# Patient Record
Sex: FEMALE | Race: WHITE | NOT HISPANIC OR LATINO | Employment: FULL TIME | URBAN - METROPOLITAN AREA
[De-identification: names, ages, dates, MRNs, and addresses within clinical notes are randomized per-mention and may not be internally consistent; named-entity substitution may affect disease eponyms.]

---

## 2023-09-15 LAB
ALANINE AMINOTRANSFERASE (SGPT) (U/L) IN SER/PLAS: 10 U/L (ref 7–45)
ASPARTATE AMINOTRANSFERASE (SGOT) (U/L) IN SER/PLAS: 11 U/L (ref 9–39)
CREATININE (MG/DL) IN SER/PLAS: 0.61 MG/DL (ref 0.5–1.05)
CREATININE (MG/DL) IN URINE: 82 MG/DL (ref 20–320)
ERYTHROCYTE DISTRIBUTION WIDTH (RATIO) BY AUTOMATED COUNT: 12 % (ref 11.5–14.5)
ERYTHROCYTE MEAN CORPUSCULAR HEMOGLOBIN CONCENTRATION (G/DL) BY AUTOMATED: 33.3 G/DL (ref 32–36)
ERYTHROCYTE MEAN CORPUSCULAR VOLUME (FL) BY AUTOMATED COUNT: 93 FL (ref 80–100)
ERYTHROCYTES (10*6/UL) IN BLOOD BY AUTOMATED COUNT: 4.97 X10E12/L (ref 4–5.2)
GFR FEMALE: >90 ML/MIN/1.73M2
HEMATOCRIT (%) IN BLOOD BY AUTOMATED COUNT: 46.3 % (ref 36–46)
HEMOGLOBIN (G/DL) IN BLOOD: 15.4 G/DL (ref 12–16)
LACTATE DEHYDROGENASE (U/L) IN SER/PLAS BY LAC->PYR RXN: 138 U/L (ref 84–246)
LEUKOCYTES (10*3/UL) IN BLOOD BY AUTOMATED COUNT: 9.8 X10E9/L (ref 4.4–11.3)
PLATELETS (10*3/UL) IN BLOOD AUTOMATED COUNT: 225 X10E9/L (ref 150–450)
PROTEIN (MG/DL) IN URINE: 7 MG/DL (ref 5–24)
PROTEIN/CREATININE (MG/MG) IN URINE: 0.09 MG/MG CREAT (ref 0–0.17)
URATE (MG/DL) IN SER/PLAS: 3.6 MG/DL (ref 2.3–6.7)

## 2023-09-16 LAB
ABO GROUP (TYPE) IN BLOOD: NORMAL
ANTIBODY SCREEN: NORMAL
HEPATITIS B VIRUS SURFACE AG PRESENCE IN SERUM: NONREACTIVE
HEPATITIS C VIRUS AB PRESENCE IN SERUM: NONREACTIVE
HIV 1/ 2 AG/AB SCREEN: NONREACTIVE
RH FACTOR: NORMAL
RUBELLA VIRUS IGG AB: POSITIVE
SYPHILIS TOTAL AB: NONREACTIVE

## 2023-10-15 PROBLEM — F33.1 MODERATE RECURRENT MAJOR DEPRESSION (MULTI): Status: ACTIVE | Noted: 2023-10-15

## 2023-10-15 PROBLEM — F12.90 MARIJUANA USER: Status: ACTIVE | Noted: 2023-10-15

## 2023-10-15 PROBLEM — E55.9 VITAMIN D DEFICIENCY: Status: ACTIVE | Noted: 2023-10-15

## 2023-10-15 PROBLEM — O35.9XX0 SUSPECTED FETAL ANOMALY, ANTEPARTUM (HHS-HCC): Status: ACTIVE | Noted: 2023-10-15

## 2023-10-15 PROBLEM — Z87.59 HISTORY OF PREGNANCY INDUCED HYPERTENSION: Status: ACTIVE | Noted: 2023-10-15

## 2023-10-15 PROBLEM — O99.210 OBESITY IN PREGNANCY (HHS-HCC): Status: ACTIVE | Noted: 2023-10-15

## 2023-10-15 PROBLEM — F41.1 GAD (GENERALIZED ANXIETY DISORDER): Status: ACTIVE | Noted: 2023-10-15

## 2023-10-15 PROBLEM — K21.00 REFLUX ESOPHAGITIS: Status: ACTIVE | Noted: 2023-10-15

## 2023-10-15 PROBLEM — Z87.59 HISTORY OF GESTATIONAL HYPERTENSION: Status: ACTIVE | Noted: 2023-10-15

## 2023-10-15 RX ORDER — SYRINGE WITH NEEDLE, INSULIN
1 SYRINGE, EMPTY DISPOSABLE MISCELLANEOUS DAILY
COMMUNITY

## 2023-10-15 RX ORDER — ASPIRIN 81 MG/1
162 TABLET ORAL
COMMUNITY
End: 2024-05-05 | Stop reason: HOSPADM

## 2023-10-15 RX ORDER — ACETAMINOPHEN 500 MG
500 TABLET ORAL EVERY 6 HOURS PRN
COMMUNITY
Start: 2021-12-20 | End: 2023-10-17 | Stop reason: WASHOUT

## 2023-10-15 RX ORDER — CITALOPRAM 20 MG/1
20 TABLET, FILM COATED ORAL DAILY
COMMUNITY
Start: 2019-12-11 | End: 2024-04-01 | Stop reason: SDUPTHER

## 2023-10-15 RX ORDER — TRIAMCINOLONE ACETONIDE 1 MG/G
CREAM TOPICAL
COMMUNITY
Start: 2016-08-19 | End: 2023-10-17 | Stop reason: WASHOUT

## 2023-10-15 RX ORDER — DOCUSATE SODIUM 100 MG/1
100 CAPSULE, LIQUID FILLED ORAL 2 TIMES DAILY PRN
COMMUNITY
Start: 2021-12-20 | End: 2023-10-17 | Stop reason: WASHOUT

## 2023-10-15 RX ORDER — IBUPROFEN 600 MG/1
600 TABLET ORAL EVERY 6 HOURS PRN
COMMUNITY
Start: 2021-12-20 | End: 2023-10-17 | Stop reason: WASHOUT

## 2023-10-15 RX ORDER — ERGOCALCIFEROL (VITAMIN D2) 10 MCG
TABLET ORAL
COMMUNITY
Start: 2019-06-13 | End: 2023-10-17 | Stop reason: WASHOUT

## 2023-10-15 RX ORDER — KETOCONAZOLE 20 MG/G
CREAM TOPICAL
COMMUNITY
Start: 2016-08-19 | End: 2023-10-17 | Stop reason: WASHOUT

## 2023-10-17 ENCOUNTER — INITIAL PRENATAL (OUTPATIENT)
Dept: OBSTETRICS AND GYNECOLOGY | Facility: CLINIC | Age: 28
End: 2023-10-17
Payer: COMMERCIAL

## 2023-10-17 VITALS — WEIGHT: 188 LBS | SYSTOLIC BLOOD PRESSURE: 108 MMHG | BODY MASS INDEX: 27.76 KG/M2 | DIASTOLIC BLOOD PRESSURE: 70 MMHG

## 2023-10-17 DIAGNOSIS — F41.9 ANXIETY: ICD-10-CM

## 2023-10-17 DIAGNOSIS — Z34.81 NORMAL PREGNANCY IN MULTIGRAVIDA IN FIRST TRIMESTER (HHS-HCC): Primary | ICD-10-CM

## 2023-10-17 DIAGNOSIS — Z87.59 HISTORY OF GESTATIONAL HYPERTENSION: ICD-10-CM

## 2023-10-17 DIAGNOSIS — Z12.4 CERVICAL CANCER SCREENING: ICD-10-CM

## 2023-10-17 LAB
POC APPEARANCE, URINE: CLEAR
POC BILIRUBIN, URINE: NEGATIVE
POC BLOOD, URINE: NEGATIVE
POC COLOR, URINE: YELLOW
POC GLUCOSE, URINE: NEGATIVE MG/DL
POC KETONES, URINE: NEGATIVE MG/DL
POC LEUKOCYTES, URINE: NEGATIVE
POC NITRITE,URINE: NEGATIVE
POC PH, URINE: 6 PH
POC PROTEIN, URINE: NEGATIVE MG/DL
POC SPECIFIC GRAVITY, URINE: 1.02
POC UROBILINOGEN, URINE: 0.2 EU/DL

## 2023-10-17 PROCEDURE — 0500F INITIAL PRENATAL CARE VISIT: CPT | Performed by: OBSTETRICS & GYNECOLOGY

## 2023-10-17 PROCEDURE — 87800 DETECT AGNT MULT DNA DIREC: CPT

## 2023-10-17 PROCEDURE — 87086 URINE CULTURE/COLONY COUNT: CPT

## 2023-10-17 PROCEDURE — 88142 CYTOPATH C/V THIN LAYER: CPT

## 2023-10-17 PROCEDURE — 81003 URINALYSIS AUTO W/O SCOPE: CPT | Performed by: OBSTETRICS & GYNECOLOGY

## 2023-10-17 ASSESSMENT — SOCIAL DETERMINANTS OF HEALTH (SDOH)
WITHIN THE LAST YEAR, HAVE TO BEEN RAPED OR FORCED TO HAVE ANY KIND OF SEXUAL ACTIVITY BY YOUR PARTNER OR EX-PARTNER?: NO
WITHIN THE LAST YEAR, HAVE YOU BEEN KICKED, HIT, SLAPPED, OR OTHERWISE PHYSICALLY HURT BY YOUR PARTNER OR EX-PARTNER?: NO
WITHIN THE LAST YEAR, HAVE YOU BEEN AFRAID OF YOUR PARTNER OR EX-PARTNER?: NO
WITHIN THE LAST YEAR, HAVE YOU BEEN HUMILIATED OR EMOTIONALLY ABUSED IN OTHER WAYS BY YOUR PARTNER OR EX-PARTNER?: NO

## 2023-10-17 NOTE — PATIENT INSTRUCTIONS
Welcome to prenatal care with [facility]!  You were seen in the office today for your initiation to prenatal care and had normal findings on exam  Continue routine OB precautions at home  Your labs were reviewed today and were normal. Routine pelvic cultures, urine culture, and pap smear (if you were due) were done today and you will be notified of the results  If you have had genetic screening labs done, we usually receive the results from [facility] within 7-9 business days and we will call you with the results.   Continue taking prenatal vitamins   Avoid sick contacts and consider getting your Flu (available in office during flu season) and COVID vaccines to protect against infection in pregnancy    What to expect:  -    You will see each of our physicians at least once during your prenatal care. There are 5 physicians (Placido Herrera, Israel, Acosta, Davis, and Marlon) and our NP Maribel Winters. We rotate OB call to be able to provide the best care to our patients during this important time, and we want to make sure you have had a chance to meet each physician prior to coming in for labor.   -    We will see you in the office every 4 weeks in the first two trimesters, every 2 weeks between 30 and 36 weeks gestation, and weekly following 36 weeks. Anatomy ultrasounds are done at [facility] around 20 weeks, gestational diabetes and anemia screening is done through outpatient labs between 25 and 28 weeks gestation, and labor and delivery preparations (ultrasound to confirm presentation, consent forms, etc) are done at 36 weeks in the office.   -    Visits can seem quick, but provide us with quite a bit of important information. So it is important to try not to miss any visits if possible and make up any cancelled appointments as soon as possible. Make sure you come to each visit with a full bladder because urine testing for bacteria, glucose, and protein are done at each visit. And although the OB visits may seem quick,  we are happy to take the time to answer any questions or discuss any concerns you may have  -    We deliver at [facility]: [address] [place], [address]  -    Birth, lactation, and parenting classes, as well as scheduling for hospital tours can be done through [email or web address].       Make an appointment for routine care in the office in the next 4 weeks  If you are having any bleeding, pain, severe nausea and/or vomiting, or any other concerns prior to your next visit, please call the office to speak to the physician on call. This includes after hours, weekends, and holidays, when the answering service will be able to connect you with the physician on call. [phone] ([place] [facility]) or [phone] ([place] [facility]).    Congratulations, we are excited to partner with you in the care of your pregnancy!

## 2023-10-17 NOTE — PROGRESS NOTES
Subjective   Patient ID [id number]   Geeta Reynaga is a 28 y.o.  at 12w3d with a working estimated date of delivery of 2024, by Ultrasound who presents for an initial prenatal visit. This pregnancy is unplanned.    Her pregnancy is complicated by:  Anxiety  Hx of GHTN    OB History    Para Term  AB Living   3 2 2     2   SAB IAB Ectopic Multiple Live Births           2      # Outcome Date GA Lbr Shin/2nd Weight Sex Delivery Anes PTL Lv   3 Current            2 Term 21 39w0d   F Vag-Spont EPI N OLGA   1 Term 20 40w0d  3260 g M Vag-Spont EPI N OLGA          Objective   Physical Exam  Weight: 85.3 kg (188 lb)  Expected Total Weight Gain: 7 kg (15 lb)-11.5 kg (25 lb)   Pregravid BMI: 26.57  BP: 108/70    Fetal Heart Rate: +     Physical Exam  Constitutional:       Appearance: Normal appearance.   Genitourinary:      Genitourinary Comments: Vulva normal in appearance without discoloration, rashes, lesions. Vagina is negative for blood, discharge, lesions. Uterus is small, mobile, non tender. There is no cervical motion tenderness, adnexal masses/tenderness.    Cardiovascular:      Rate and Rhythm: Normal rate and regular rhythm.   Pulmonary:      Effort: Pulmonary effort is normal.      Breath sounds: Normal breath sounds.   Abdominal:      General: Abdomen is flat.      Palpations: Abdomen is soft.      Tenderness: There is no abdominal tenderness.   Musculoskeletal:         General: Normal range of motion.      Cervical back: Normal range of motion.   Neurological:      General: No focal deficit present.   Skin:     General: Skin is warm and dry.   Psychiatric:         Mood and Affect: Mood normal.   Vitals and nursing note reviewed.         Prenatal Labs  wnl    Assessment/Plan   Problem List Items Addressed This Visit             ICD-10-CM    History of gestational hypertension Z87.59     Other Visit Diagnoses         Codes    Normal pregnancy in multigravida in first trimester     -  Primary Z34.81    PNL reviewed/wnl. Advised PNV  Declines genetic screening  RTO 4 weeks     Anxiety     F41.9    Continue Citalopram as prescribed             Immunizations: Advised Tdap, Flu, RSV, COVID   Prenatal Labs wnl  Daily prenatal vitamins prescribed  First trimester screening and second trimester screening discussed. Patient decided to decline  Follow up in 4 weeks for return OB visit.  Nu Lozano, DO

## 2023-10-19 LAB
BACTERIA UR CULT: NORMAL
C TRACH RRNA SPEC QL NAA+PROBE: NEGATIVE
N GONORRHOEA DNA SPEC QL PROBE+SIG AMP: NEGATIVE

## 2023-10-30 LAB
CYTOLOGY CMNT CVX/VAG CYTO-IMP: NORMAL
LAB AP HPV GENOTYPE QUESTION: YES
LAB AP HPV HR: NORMAL
LAB AP PAP ADDITIONAL TESTS: NORMAL
LABORATORY COMMENT REPORT: NORMAL
LABORATORY COMMENT REPORT: NORMAL
LMP START DATE: NORMAL
MENSTRUAL HX REPORTED: NORMAL
PATH REPORT.TOTAL CANCER: NORMAL

## 2023-11-14 ENCOUNTER — ROUTINE PRENATAL (OUTPATIENT)
Dept: OBSTETRICS AND GYNECOLOGY | Facility: CLINIC | Age: 28
End: 2023-11-14
Payer: COMMERCIAL

## 2023-11-14 VITALS — WEIGHT: 194 LBS | DIASTOLIC BLOOD PRESSURE: 72 MMHG | SYSTOLIC BLOOD PRESSURE: 102 MMHG | BODY MASS INDEX: 28.65 KG/M2

## 2023-11-14 DIAGNOSIS — Z3A.16 16 WEEKS GESTATION OF PREGNANCY (HHS-HCC): ICD-10-CM

## 2023-11-14 LAB
POC APPEARANCE, URINE: CLEAR
POC BILIRUBIN, URINE: NEGATIVE
POC BLOOD, URINE: NEGATIVE
POC COLOR, URINE: YELLOW
POC GLUCOSE, URINE: NEGATIVE MG/DL
POC KETONES, URINE: NEGATIVE MG/DL
POC LEUKOCYTES, URINE: NEGATIVE
POC NITRITE,URINE: NEGATIVE
POC PH, URINE: 8 PH
POC PROTEIN, URINE: NEGATIVE MG/DL
POC SPECIFIC GRAVITY, URINE: 1.01
POC UROBILINOGEN, URINE: 0.2 EU/DL

## 2023-11-14 PROCEDURE — 90686 IIV4 VACC NO PRSV 0.5 ML IM: CPT | Performed by: OBSTETRICS & GYNECOLOGY

## 2023-11-14 PROCEDURE — 0501F PRENATAL FLOW SHEET: CPT | Performed by: OBSTETRICS & GYNECOLOGY

## 2023-11-14 PROCEDURE — 81003 URINALYSIS AUTO W/O SCOPE: CPT | Performed by: OBSTETRICS & GYNECOLOGY

## 2023-11-14 PROCEDURE — 90471 IMMUNIZATION ADMIN: CPT | Performed by: OBSTETRICS & GYNECOLOGY

## 2023-11-14 NOTE — PROGRESS NOTES
Subjective   Patient ID [id number]   Geeta Reynaga is a 28 y.o.  at 16w3d no FM yet.    Objective   Physical Exam  Weight: 88 kg (194 lb)  BP: 102/72  Fetal Heart Rate: 140 Fundal Height (cm): 16 cm    Lab Results   Component Value Date    HGB 15.4 09/15/2023    HCT 46.3 (H) 09/15/2023       Assessment/Plan   Declines genetics.  Flu vaccine today.  Anatomy ultrasound.  Follow up in 4 weeks for a routine prenatal visit.

## 2023-12-12 ENCOUNTER — ROUTINE PRENATAL (OUTPATIENT)
Dept: OBSTETRICS AND GYNECOLOGY | Facility: CLINIC | Age: 28
End: 2023-12-12
Payer: COMMERCIAL

## 2023-12-12 VITALS — WEIGHT: 197 LBS | BODY MASS INDEX: 29.09 KG/M2 | SYSTOLIC BLOOD PRESSURE: 110 MMHG | DIASTOLIC BLOOD PRESSURE: 60 MMHG

## 2023-12-12 DIAGNOSIS — Z34.82 MULTIGRAVIDA IN SECOND TRIMESTER (HHS-HCC): Primary | ICD-10-CM

## 2023-12-12 DIAGNOSIS — Z3A.20 20 WEEKS GESTATION OF PREGNANCY (HHS-HCC): ICD-10-CM

## 2023-12-12 LAB
POC GLUCOSE, URINE: NEGATIVE MG/DL
POC PROTEIN, URINE: ABNORMAL MG/DL

## 2023-12-12 PROCEDURE — 81003 URINALYSIS AUTO W/O SCOPE: CPT | Performed by: OBSTETRICS & GYNECOLOGY

## 2023-12-12 PROCEDURE — 0501F PRENATAL FLOW SHEET: CPT | Performed by: OBSTETRICS & GYNECOLOGY

## 2023-12-12 NOTE — PATIENT INSTRUCTIONS
You were seen in the office today for routine OB care and had normal findings on exam  Continue routine OB precautions at home  Continue taking prenatal vitamins   Avoid sick contacts and consider getting your Flu (available in office during flu season) and COVID vaccines to protect against infection in pregnancy  You will be given an order for your anatomy ultrasound. Please schedule at [facility] between 19 and 22 weeks gestation [phone]  You will be given a bottle of Glucola and orders for your second trimester labs. Please complete these between 25 and 28 weeks gestation. You may complete these at any  outpatient lab, and do not need an appointment  Make an appointment for routine care in the office in the next 4 weeks  If you are having any concerns prior to your next visit please call the office to speak to the physician on call. This includes after hours, weekends, and holidays, when the answering service will be able to connect you with the physician on call. [phone] ([place] [facility]) or [phone] [facility].

## 2023-12-12 NOTE — PROGRESS NOTES
Subjective   Patient ID [id number]   Geeta Reynaga is a 28 y.o.  at 20w3d with a working estimated date of delivery of 2024, by Ultrasound who presents for a routine prenatal visit. She denies vaginal bleeding, leakage of fluid, decreased fetal movements, or contractions.    Her pregnancy is complicated by:  Hx GHTN  Anxiety    Objective   Physical Exam  Weight: 89.4 kg (197 lb)  Expected Total Weight Gain: 7 kg (15 lb)-11.5 kg (25 lb)   Pregravid BMI: 26.57  BP: 110/60         Prenatal Labs  Urine dip:  Lab Results   Component Value Date    KETONESU NEGATIVE 2023       Lab Results   Component Value Date    HGB 15.4 09/15/2023    HCT 46.3 (H) 09/15/2023    ABO A 09/15/2023    HEPBSAG NONREACTIVE 09/15/2023           Imaging: scheduled for tomorrow    Assessment/Plan   Problem List Items Addressed This Visit    None  Visit Diagnoses         Codes    Multigravida in second trimester    -  Primary Z34.82    Patient doing well  Precautions given  RTO 4 wk     20 weeks gestation of pregnancy     Z3A.20    Relevant Orders    POC Urine Dip (Completed)            Continue prenatal vitamin.  Labs reviewed.  Rhogam Not indicated  GTT 25-28 wk.    Follow up in 4 weeks for a routine prenatal visit.  Nu Lozano DO

## 2023-12-13 ENCOUNTER — ANCILLARY PROCEDURE (OUTPATIENT)
Dept: RADIOLOGY | Facility: CLINIC | Age: 28
End: 2023-12-13
Payer: COMMERCIAL

## 2023-12-13 DIAGNOSIS — Z34.90 PREGNANT (HHS-HCC): ICD-10-CM

## 2023-12-13 PROCEDURE — 76811 OB US DETAILED SNGL FETUS: CPT | Performed by: OBSTETRICS & GYNECOLOGY

## 2023-12-13 PROCEDURE — 76811 OB US DETAILED SNGL FETUS: CPT

## 2024-01-09 ENCOUNTER — ROUTINE PRENATAL (OUTPATIENT)
Dept: OBSTETRICS AND GYNECOLOGY | Facility: CLINIC | Age: 29
End: 2024-01-09
Payer: COMMERCIAL

## 2024-01-09 VITALS — WEIGHT: 206 LBS | DIASTOLIC BLOOD PRESSURE: 64 MMHG | BODY MASS INDEX: 30.42 KG/M2 | SYSTOLIC BLOOD PRESSURE: 126 MMHG

## 2024-01-09 DIAGNOSIS — Z3A.24 24 WEEKS GESTATION OF PREGNANCY (HHS-HCC): Primary | ICD-10-CM

## 2024-01-09 LAB
POC GLUCOSE, URINE: NEGATIVE MG/DL
POC PROTEIN, URINE: ABNORMAL MG/DL

## 2024-01-09 PROCEDURE — 0501F PRENATAL FLOW SHEET: CPT | Performed by: OBSTETRICS & GYNECOLOGY

## 2024-01-09 NOTE — PROGRESS NOTES
Geeta Reynaga is a 28 y.o.  at GA 24w3d with a working estimated date of delivery of  24 by last menstrual period presents today for her routine prenatal visit.   No acute concerns.   +FM. No ctx, VB or LOF.   Sharp bilateral lower pelvic pain from positional change. Self resolves. C/w round ligament pain.   history of Gestational HTN x2. Normotensive this pregnancy  Notes trace protein at last two visit. BP remains normotensive. Denies hx of renal pathology.  Discussed role of home BP monitoring could be initiated due to h/o GHTN x2 and pt to call if elevated. Or we can keep monitoring in office with formal recs for home Bps monitoring if abnormal.   CBC/GCT ordered  RTC in 4 weeks or sooner prn    Scribe Attestation  By signing my name below, IDarline, Scribe   attest that this documentation has been prepared under the direction and in the presence of Joey Mason MD.

## 2024-01-30 ENCOUNTER — PATIENT MESSAGE (OUTPATIENT)
Dept: OBSTETRICS AND GYNECOLOGY | Facility: CLINIC | Age: 29
End: 2024-01-30
Payer: COMMERCIAL

## 2024-02-06 ENCOUNTER — ROUTINE PRENATAL (OUTPATIENT)
Dept: OBSTETRICS AND GYNECOLOGY | Facility: CLINIC | Age: 29
End: 2024-02-06
Payer: COMMERCIAL

## 2024-02-06 ENCOUNTER — LAB (OUTPATIENT)
Dept: LAB | Facility: LAB | Age: 29
End: 2024-02-06
Payer: COMMERCIAL

## 2024-02-06 VITALS — DIASTOLIC BLOOD PRESSURE: 64 MMHG | WEIGHT: 210 LBS | SYSTOLIC BLOOD PRESSURE: 128 MMHG | BODY MASS INDEX: 31.01 KG/M2

## 2024-02-06 DIAGNOSIS — Z3A.24 24 WEEKS GESTATION OF PREGNANCY (HHS-HCC): ICD-10-CM

## 2024-02-06 DIAGNOSIS — Z3A.28 28 WEEKS GESTATION OF PREGNANCY (HHS-HCC): ICD-10-CM

## 2024-02-06 LAB
ERYTHROCYTE [DISTWIDTH] IN BLOOD BY AUTOMATED COUNT: 12.3 % (ref 11.5–14.5)
GLUCOSE 1H P 50 G GLC PO SERPL-MCNC: 81 MG/DL
HCT VFR BLD AUTO: 38.9 % (ref 36–46)
HGB BLD-MCNC: 13.3 G/DL (ref 12–16)
MCH RBC QN AUTO: 30.5 PG (ref 26–34)
MCHC RBC AUTO-ENTMCNC: 34.2 G/DL (ref 32–36)
MCV RBC AUTO: 89 FL (ref 80–100)
NRBC BLD-RTO: 0.2 /100 WBCS (ref 0–0)
PLATELET # BLD AUTO: 261 X10*3/UL (ref 150–450)
POC GLUCOSE, URINE: NEGATIVE MG/DL
POC PROTEIN, URINE: ABNORMAL MG/DL
RBC # BLD AUTO: 4.36 X10*6/UL (ref 4–5.2)
REFLEX ADDED, ANEMIA PANEL: NORMAL
WBC # BLD AUTO: 11.1 X10*3/UL (ref 4.4–11.3)

## 2024-02-06 PROCEDURE — 85027 COMPLETE CBC AUTOMATED: CPT

## 2024-02-06 PROCEDURE — 90471 IMMUNIZATION ADMIN: CPT | Performed by: OBSTETRICS & GYNECOLOGY

## 2024-02-06 PROCEDURE — 90715 TDAP VACCINE 7 YRS/> IM: CPT | Performed by: OBSTETRICS & GYNECOLOGY

## 2024-02-06 PROCEDURE — 82947 ASSAY GLUCOSE BLOOD QUANT: CPT

## 2024-02-06 PROCEDURE — 36415 COLL VENOUS BLD VENIPUNCTURE: CPT

## 2024-02-06 PROCEDURE — 0501F PRENATAL FLOW SHEET: CPT | Performed by: OBSTETRICS & GYNECOLOGY

## 2024-02-06 NOTE — PATIENT INSTRUCTIONS
You were seen in the office today for routine OB care   Continue routine OB precautions at home  Continue taking prenatal vitamins   Avoid sick contacts and consider getting your Flu (available in office during flu season) and COVID vaccines to protect against infection in pregnancy  We recommend getting the Tdap (available in office) and RSV vaccines to pass some immunity from mother to baby and protect your baby against RSV and Whooping cough in the first few months of life. Tdap can be given between 27 and 36 weeks, and RSV vaccinations can be given between 32 and 36 weeks gestation  Make an appointment for routine care in the office in the next 2 weeks  If you are having any painful contractions, vaginal bleeding, leaking amniotic fluid, or decrease in baby's movements prior to your next visit please call the office to speak to the physician on call. This includes after hours, weekends, and holidays, when the answering service will be able to connect you with the physician on call. [phone] ([place] [facility]) or [phone] ([place] [facility]).

## 2024-02-06 NOTE — PROGRESS NOTES
Subjective   Patient ID [id number]   Geeta Reynaga is a 28 y.o.  at 28w3d, +FM, no ctxs/VB/LOF.    Her pregnancy is complicated by:  Hx GHTN  Anxiety    Objective   Physical Exam  Weight: 95.3 kg (210 lb)  BP: 128/64  Fetal Heart Rate: 150 Fundal Height (cm): 29 cm    Lab Results   Component Value Date    HGB 15.4 09/15/2023    HCT 46.3 (H) 09/15/2023       Assessment/Plan   TDAP today.  Glucola today.  Follow up in 4 weeks for a routine prenatal visit.

## 2024-03-07 ENCOUNTER — ROUTINE PRENATAL (OUTPATIENT)
Dept: OBSTETRICS AND GYNECOLOGY | Facility: CLINIC | Age: 29
End: 2024-03-07
Payer: COMMERCIAL

## 2024-03-07 VITALS — WEIGHT: 211 LBS | BODY MASS INDEX: 31.16 KG/M2 | DIASTOLIC BLOOD PRESSURE: 68 MMHG | SYSTOLIC BLOOD PRESSURE: 112 MMHG

## 2024-03-07 DIAGNOSIS — Z3A.32 32 WEEKS GESTATION OF PREGNANCY (HHS-HCC): ICD-10-CM

## 2024-03-07 LAB
POC GLUCOSE, URINE: NEGATIVE MG/DL
POC PROTEIN, URINE: NEGATIVE MG/DL

## 2024-03-07 PROCEDURE — 0501F PRENATAL FLOW SHEET: CPT | Performed by: OBSTETRICS & GYNECOLOGY

## 2024-03-07 NOTE — PROGRESS NOTES
Subjective   Geeta Reynaga is a 29 y.o.  at 32w5d, presents for a routine prenatal visit.   No complaints.  Reports good fetal movement.    Objective     /68   Wt 95.7 kg (211 lb)   LMP 2023   BMI 31.16 kg/m²   2023 glucose 81. H/H 13.3/38.9%    Plan    Swati Herrera MD

## 2024-03-21 ENCOUNTER — ROUTINE PRENATAL (OUTPATIENT)
Dept: OBSTETRICS AND GYNECOLOGY | Facility: CLINIC | Age: 29
End: 2024-03-21
Payer: COMMERCIAL

## 2024-03-21 VITALS — BODY MASS INDEX: 31.6 KG/M2 | SYSTOLIC BLOOD PRESSURE: 112 MMHG | DIASTOLIC BLOOD PRESSURE: 78 MMHG | WEIGHT: 214 LBS

## 2024-03-21 DIAGNOSIS — Z34.83 PRENATAL CARE, SUBSEQUENT PREGNANCY, THIRD TRIMESTER (HHS-HCC): Primary | ICD-10-CM

## 2024-03-21 DIAGNOSIS — Z3A.34 34 WEEKS GESTATION OF PREGNANCY (HHS-HCC): ICD-10-CM

## 2024-03-21 LAB
POC APPEARANCE, URINE: CLEAR
POC BILIRUBIN, URINE: NEGATIVE
POC BLOOD, URINE: NEGATIVE
POC COLOR, URINE: YELLOW
POC GLUCOSE, URINE: NEGATIVE MG/DL
POC KETONES, URINE: NEGATIVE MG/DL
POC LEUKOCYTES, URINE: NEGATIVE
POC NITRITE,URINE: NEGATIVE
POC PH, URINE: 7 PH
POC PROTEIN, URINE: NEGATIVE MG/DL
POC SPECIFIC GRAVITY, URINE: 1.01
POC UROBILINOGEN, URINE: 0.2 EU/DL

## 2024-03-21 PROCEDURE — 0501F PRENATAL FLOW SHEET: CPT | Performed by: OBSTETRICS & GYNECOLOGY

## 2024-03-21 NOTE — PATIENT INSTRUCTIONS
You were seen in the office today for routine OB care and had normal findings on exam  Continue routine OB precautions at home  Continue taking prenatal vitamins   Avoid sick contacts and consider getting your Flu (available in office during flu season) and COVID vaccines to protect against infection in pregnancy  We recommend getting the Tdap (available in office) and RSV vaccines to pass some immunity from mother to baby and protect your baby against RSV and Whooping cough in the first few months of life. Tdap can be given between 27 and 36 weeks, and RSV vaccinations can be given between 32 and 36 weeks gestation  Make an appointment for routine care in the office in the next 2 weeks  If you are having any painful contractions, vaginal bleeding, leaking amniotic fluid, or decrease in baby's movements prior to your next visit please call the office to speak to the physician on call. This includes after hours, weekends, and holidays, when the answering service will be able to connect you with the physician on call. [phone] ([place] [facility]) or [phone] ([place] [facility]).

## 2024-03-21 NOTE — PROGRESS NOTES
Subjective   Patient ID [id number]   Geeta Reynaga is a 29 y.o.  at 34w5d with a working estimated date of delivery of 2024, by Ultrasound who presents for a routine prenatal visit. She denies vaginal bleeding, leakage of fluid, decreased fetal movements, or contractions.    Her pregnancy is complicated by:  Hx GHTN  Anxiety    Objective   Physical Exam:   Weight: 97.1 kg (214 lb)  Expected Total Weight Gain: 7 kg (15 lb)-11.5 kg (25 lb)   Pregravid BMI: 26.57  BP: 112/78                  Prenatal Labs  Urine Dip:  Lab Results   Component Value Date    KETONESU NEGATIVE 2024     Lab Results   Component Value Date    HGB 13.3 2024    HCT 38.9 2024    ABO A 09/15/2023    HEPBSAG NONREACTIVE 09/15/2023           Imagin for presentation    Assessment/Plan   Problem List Items Addressed This Visit    None  Visit Diagnoses         Codes    Prenatal care, subsequent pregnancy, third trimester    -  Primary Z34.83    Patient doing well  US/GBS/consent next visit  Precautions given  RTO 2 weeks     34 weeks gestation of pregnancy     Z3A.34    Relevant Orders    POCT UA (nonautomated) manually resulted (Completed)          Continue prenatal vitamin.  Labs reviewed.  GBS 36 weeks  Expected mode of delivery TBD  Follow up in 1 week for a routine prenatal visit.  Nu Lozano DO

## 2024-03-27 PROBLEM — Z86.19 HISTORY OF VARICELLA: Status: ACTIVE | Noted: 2024-03-27

## 2024-04-01 ENCOUNTER — OFFICE VISIT (OUTPATIENT)
Dept: PRIMARY CARE | Facility: CLINIC | Age: 29
End: 2024-04-01
Payer: COMMERCIAL

## 2024-04-01 VITALS
SYSTOLIC BLOOD PRESSURE: 118 MMHG | HEART RATE: 86 BPM | TEMPERATURE: 98.2 F | BODY MASS INDEX: 31.96 KG/M2 | HEIGHT: 69 IN | OXYGEN SATURATION: 97 % | WEIGHT: 215.8 LBS | DIASTOLIC BLOOD PRESSURE: 72 MMHG

## 2024-04-01 DIAGNOSIS — F41.1 GAD (GENERALIZED ANXIETY DISORDER): Primary | ICD-10-CM

## 2024-04-01 DIAGNOSIS — R59.0 LYMPHADENOPATHY, AXILLARY: ICD-10-CM

## 2024-04-01 PROCEDURE — 99203 OFFICE O/P NEW LOW 30 MIN: CPT | Performed by: FAMILY MEDICINE

## 2024-04-01 PROCEDURE — 1036F TOBACCO NON-USER: CPT | Performed by: FAMILY MEDICINE

## 2024-04-01 RX ORDER — CITALOPRAM 20 MG/1
20 TABLET, FILM COATED ORAL DAILY
Qty: 90 TABLET | Refills: 3 | Status: SHIPPED | OUTPATIENT
Start: 2024-04-01 | End: 2025-04-01

## 2024-04-01 ASSESSMENT — LIFESTYLE VARIABLES: HOW OFTEN DO YOU HAVE A DRINK CONTAINING ALCOHOL: NEVER

## 2024-04-01 ASSESSMENT — PATIENT HEALTH QUESTIONNAIRE - PHQ9
2. FEELING DOWN, DEPRESSED OR HOPELESS: NOT AT ALL
SUM OF ALL RESPONSES TO PHQ9 QUESTIONS 1 AND 2: 0
1. LITTLE INTEREST OR PLEASURE IN DOING THINGS: NOT AT ALL

## 2024-04-01 ASSESSMENT — ENCOUNTER SYMPTOMS
NERVOUS/ANXIOUS: 0
FEVER: 0
FATIGUE: 1
AGITATION: 0
CHILLS: 0
COUGH: 0
DYSPHORIC MOOD: 0
SHORTNESS OF BREATH: 0
SORE THROAT: 0
ADENOPATHY: 1

## 2024-04-01 ASSESSMENT — PAIN SCALES - GENERAL: PAINLEVEL: 0-NO PAIN

## 2024-04-01 NOTE — PROGRESS NOTES
"Subjective   Patient ID: Geeta Reynaga is a 29 y.o. female who presents for Establish Care (DUE APRIL 26 TH).    HPI patient [facility].  She has a history of anxiety.  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  She has been stable on the dose of citalopram 20 mg a day for quite a while.  In addition she does have a lump in her right armpit that she would like evaluated.  No fever sweats or chills.  No cough or night sweats.  She does have a brother with history of lymphoma.    Review of Systems   Constitutional:  Positive for fatigue. Negative for chills and fever.   HENT:  Negative for congestion and sore throat.    Respiratory:  Negative for cough and shortness of breath.    Cardiovascular:  Negative for chest pain.   Hematological:  Positive for adenopathy.   Psychiatric/Behavioral:  Negative for agitation and dysphoric mood. The patient is not nervous/anxious.        Objective   /72   Pulse 86   Temp 36.8 °C (98.2 °F)   Ht 1.753 m (5' 9\")   Wt 97.9 kg (215 lb 12.8 oz)   LMP 07/14/2023   SpO2 97%   BMI 31.87 kg/m²     Physical Exam  Constitutional:       Appearance: Normal appearance.   Lymphadenopathy:      Upper Body:      Right upper body: Axillary adenopathy (1cm well circumscribed, mobile, nontender lymph node palpated at 4 o colock poistion in right axilla) present.   Neurological:      Mental Status: She is alert.   Psychiatric:         Mood and Affect: Mood normal.         Behavior: Behavior normal.         Thought Content: Thought content normal.         Judgment: Judgment normal.         Assessment/Plan   Problem List Items Addressed This Visit             ICD-10-CM    RONA (generalized anxiety disorder) - Primary F41.1    Relevant Medications    citalopram (CeleXA) 20 mg tablet     Other Visit Diagnoses         Codes    Lymphadenopathy, axillary     R59.0          Refilled celexa  Monitor size of node, if " change/increases or doesn't decrease in size to jeet for further eval with labs versus excision

## 2024-04-04 ENCOUNTER — ROUTINE PRENATAL (OUTPATIENT)
Dept: OBSTETRICS AND GYNECOLOGY | Facility: CLINIC | Age: 29
End: 2024-04-04
Payer: COMMERCIAL

## 2024-04-04 VITALS — WEIGHT: 216 LBS | SYSTOLIC BLOOD PRESSURE: 112 MMHG | BODY MASS INDEX: 31.9 KG/M2 | DIASTOLIC BLOOD PRESSURE: 76 MMHG

## 2024-04-04 DIAGNOSIS — Z3A.36 36 WEEKS GESTATION OF PREGNANCY (HHS-HCC): ICD-10-CM

## 2024-04-04 DIAGNOSIS — Z34.83 MULTIGRAVIDA IN THIRD TRIMESTER (HHS-HCC): Primary | ICD-10-CM

## 2024-04-04 PROCEDURE — 87081 CULTURE SCREEN ONLY: CPT

## 2024-04-04 PROCEDURE — 0501F PRENATAL FLOW SHEET: CPT | Performed by: OBSTETRICS & GYNECOLOGY

## 2024-04-04 NOTE — PATIENT INSTRUCTIONS
You were seen in the office today for routine OB care and had normal findings on exam  Continue routine OB precautions at home  Continue taking prenatal vitamins   Avoid sick contacts and consider getting your Flu (available in office during flu season) and COVID vaccines to protect against infection in pregnancy  We recommend getting the Tdap (available in office) and RSV vaccines to pass some immunity from mother to baby and protect your baby against RSV and Whooping cough in the first few months of life. Tdap can be given between 27 and 36 weeks, and RSV vaccinations can be given between 32 and 36 weeks gestation  Make an appointment for routine care in the office in the next 2 weeks  If you are having any painful contractions, vaginal bleeding, leaking amniotic fluid, or decrease in baby's movements prior to your next visit please call the office to speak to the physician on call. This includes after hours, weekends, and holidays, when the answering service will be able to connect you with the physician on call. [phone] ([place] [facility]) or [phone] ([place] [facility]).

## 2024-04-04 NOTE — PROGRESS NOTES
Subjective   Patient ID [id number]   Geeta Reynaga is a 29 y.o.  at 36w5d with a working estimated date of delivery of 2024, by Ultrasound who presents for a routine prenatal visit. She denies vaginal bleeding, leakage of fluid, decreased fetal movements, or contractions.    Her pregnancy is complicated by:  Hx of GHTN  Anxiety    Objective   Physical Exam:   Weight: 98 kg (216 lb)  Expected Total Weight Gain: 7 kg (15 lb)-11.5 kg (25 lb)   Pregravid BMI: 26.57  BP: 112/76                  Prenatal Labs  Urine Dip:  Lab Results   Component Value Date    KETONESU NEGATIVE 2024     Lab Results   Component Value Date    HGB 13.3 2024    HCT 38.9 2024    ABO A 09/15/2023    HEPBSAG NONREACTIVE 09/15/2023           Imaging: vertex       Assessment/Plan   Problem List Items Addressed This Visit    None  Visit Diagnoses         Codes    Multigravida in third trimester    -  Primary Z34.83    Patient doing well  US confirms vertex. GBS/consent done   Precautions given  RTO 1 week     36 weeks gestation of pregnancy     Z3A.36    Relevant Orders    POCT UA (nonautomated) manually resulted (Completed)          Continue prenatal vitamin.  Labs reviewed.  GBS taken.  Expected mode of delivery   Follow up in 1 week for a routine prenatal visit.  Nu Lozano DO

## 2024-04-07 LAB — GP B STREP GENITAL QL CULT: NORMAL

## 2024-04-11 ENCOUNTER — ROUTINE PRENATAL (OUTPATIENT)
Dept: OBSTETRICS AND GYNECOLOGY | Facility: CLINIC | Age: 29
End: 2024-04-11
Payer: COMMERCIAL

## 2024-04-11 VITALS — WEIGHT: 217 LBS | BODY MASS INDEX: 32.05 KG/M2 | SYSTOLIC BLOOD PRESSURE: 110 MMHG | DIASTOLIC BLOOD PRESSURE: 70 MMHG

## 2024-04-11 DIAGNOSIS — Z34.83 PRENATAL CARE, SUBSEQUENT PREGNANCY, THIRD TRIMESTER (HHS-HCC): Primary | ICD-10-CM

## 2024-04-11 DIAGNOSIS — Z3A.37 37 WEEKS GESTATION OF PREGNANCY (HHS-HCC): ICD-10-CM

## 2024-04-11 LAB
POC GLUCOSE, URINE: NEGATIVE MG/DL
POC PROTEIN, URINE: ABNORMAL MG/DL
POC SPECIFIC GRAVITY, URINE: 1.01

## 2024-04-11 PROCEDURE — 0501F PRENATAL FLOW SHEET: CPT | Performed by: OBSTETRICS & GYNECOLOGY

## 2024-04-11 NOTE — PROGRESS NOTES
Prenatal Visit    Patient presents for routine prenatal visit.   Feeling well. No changes.  Baby is moving. No abdominal pain. No bleeding. No leaking fluid.    Objective   Physical Exam:   Weight: 98.4 kg (217 lb)  Expected Total Weight Gain: 7 kg (15 lb)-11.5 kg (25 lb)   Pregravid BMI: 26.57  BP: 110/70  Fetal Heart Rate: 135 Fundal Height (cm): 37 cm    Dilation: 1 Effacement (%): 60 Fetal Station: -3    Assessment/Plan   1. Prenatal care, subsequent pregnancy, third trimester  2. 37 weeks gestation of pregnancy  - POCT UA Automated manually resulted    Doing well, normal exam today.  Continue prenatal vitamins  Precautions given. Advised her to call for any concerns.  Follow up in 1 week.

## 2024-04-18 ENCOUNTER — ROUTINE PRENATAL (OUTPATIENT)
Dept: OBSTETRICS AND GYNECOLOGY | Facility: CLINIC | Age: 29
End: 2024-04-18
Payer: COMMERCIAL

## 2024-04-18 VITALS — WEIGHT: 219.8 LBS | DIASTOLIC BLOOD PRESSURE: 82 MMHG | BODY MASS INDEX: 32.46 KG/M2 | SYSTOLIC BLOOD PRESSURE: 134 MMHG

## 2024-04-18 DIAGNOSIS — Z34.83 PRENATAL CARE, SUBSEQUENT PREGNANCY, THIRD TRIMESTER (HHS-HCC): Primary | ICD-10-CM

## 2024-04-18 DIAGNOSIS — Z3A.38 38 WEEKS GESTATION OF PREGNANCY (HHS-HCC): ICD-10-CM

## 2024-04-18 LAB
POC APPEARANCE, URINE: CLEAR
POC BLOOD, URINE: NEGATIVE
POC COLOR, URINE: YELLOW
POC GLUCOSE, URINE: NEGATIVE MG/DL
POC KETONES, URINE: NEGATIVE MG/DL
POC LEUKOCYTES, URINE: NEGATIVE
POC NITRITE,URINE: NEGATIVE
POC PROTEIN, URINE: NEGATIVE MG/DL

## 2024-04-18 PROCEDURE — 0501F PRENATAL FLOW SHEET: CPT | Performed by: OBSTETRICS & GYNECOLOGY

## 2024-04-18 NOTE — PATIENT INSTRUCTIONS
You were seen in the office today for routine OB care and had normal findings on exam  Continue routine OB precautions at home  Continue taking prenatal vitamins   Avoid sick contacts and consider getting your Flu (available in office during flu season) and COVID vaccines to protect against infection in pregnancy  We recommend getting the Tdap (available in office) and RSV vaccines to pass some immunity from mother to baby and protect your baby against RSV and Whooping cough in the first few months of life. Tdap can be given between 27 and 36 weeks, and RSV vaccinations can be given between 32 and 36 weeks gestation  Make an appointment for routine care in the office in the next 2 weeks  If you are having any painful contractions, vaginal bleeding, leaking amniotic fluid, or decrease in baby's movements prior to your next visit please call the office to speak to the physician on call. This includes after hours, weekends, and holidays, when the answering service will be able to connect you with the physician on call. [phone] ([place] [facility]) or [phone] ([place] [facility]).

## 2024-04-18 NOTE — PROGRESS NOTES
Subjective   Patient ID [id number]   Geeta Reynaga is a 29 y.o.  at 38w5d with a working estimated date of delivery of 2024, by Ultrasound who presents for a routine prenatal visit. She denies vaginal bleeding, leakage of fluid, decreased fetal movements, or contractions.    Her pregnancy is complicated by:  Hx GHTN  Anxiety    Objective   Physical Exam:   Weight: 99.7 kg (219 lb 12.8 oz)  Expected Total Weight Gain: 7 kg (15 lb)-11.5 kg (25 lb)   Pregravid BMI: 26.57  BP: 134/82                  Prenatal Labs  Urine Dip:  Lab Results   Component Value Date    KETONESU NEGATIVE 2024     Lab Results   Component Value Date    HGB 13.3 2024    HCT 38.9 2024    ABO A 09/15/2023    HEPBSAG NONREACTIVE 09/15/2023           Imaging: Vertex at 36 weeks    Assessment/Plan   Problem List Items Addressed This Visit    None  Visit Diagnoses         Codes    Prenatal care, subsequent pregnancy, third trimester ([facility])    -  Primary Z34.83    Patient doing well  Precautions given  RTO 1 week STEPAN     38 weeks gestation of pregnancy ([facility])     Z3A.38    Relevant Orders    POCT UA Automated manually resulted (Completed)          Continue prenatal vitamin.  Labs reviewed.  GBS neg  Expected mode of delivery   Follow up in 1 week for a routine prenatal visit.  Nu Lozano DO

## 2024-04-24 ENCOUNTER — ROUTINE PRENATAL (OUTPATIENT)
Dept: OBSTETRICS AND GYNECOLOGY | Facility: CLINIC | Age: 29
End: 2024-04-24
Payer: COMMERCIAL

## 2024-04-24 VITALS — DIASTOLIC BLOOD PRESSURE: 72 MMHG | SYSTOLIC BLOOD PRESSURE: 136 MMHG | WEIGHT: 216 LBS | BODY MASS INDEX: 31.9 KG/M2

## 2024-04-24 DIAGNOSIS — Z34.83 PRENATAL CARE, SUBSEQUENT PREGNANCY, THIRD TRIMESTER (HHS-HCC): Primary | ICD-10-CM

## 2024-04-24 DIAGNOSIS — Z34.90 ENCOUNTER FOR INDUCTION OF LABOR (HHS-HCC): ICD-10-CM

## 2024-04-24 DIAGNOSIS — Z3A.39 39 WEEKS GESTATION OF PREGNANCY (HHS-HCC): ICD-10-CM

## 2024-04-24 LAB
POC BLOOD, URINE: NEGATIVE
POC GLUCOSE, URINE: NEGATIVE MG/DL
POC KETONES, URINE: NEGATIVE MG/DL
POC LEUKOCYTES, URINE: ABNORMAL
POC NITRITE,URINE: NEGATIVE
POC PROTEIN, URINE: ABNORMAL MG/DL

## 2024-04-24 PROCEDURE — 59426 ANTEPARTUM CARE ONLY: CPT | Performed by: OBSTETRICS & GYNECOLOGY

## 2024-04-24 NOTE — PROGRESS NOTES
Prenatal Visit    Patient presents for routine prenatal visit.   Feeling well.  Baby is moving. No abdominal pain. Not noticing contractions.   No bleeding. No leaking fluid.    Objective   Physical Exam:   Weight: 98 kg (216 lb)  Expected Total Weight Gain: 7 kg (15 lb)-11.5 kg (25 lb)   Pregravid BMI: 26.57  BP: 136/72  Fetal Heart Rate: 130 Fundal Height (cm): 39 cm    Dilation: 1 Effacement (%): 60 Fetal Station: -3    Assessment/Plan   1. Prenatal care, subsequent pregnancy, third trimester ([facility])  2. 39 weeks gestation of pregnancy ([facility])  - POCT UA Automated manually resulted    Doing well.   Discussed scheduling IOL. She agrees. Labor induction scheduled for 5/2/24 at 9 PM.  Continue prenatal vitamins  Precautions given. Advised her to call for any concerns or signs of labor.  Follow up in 1 weeks.

## 2024-04-30 ENCOUNTER — PREP FOR PROCEDURE (OUTPATIENT)
Dept: OBSTETRICS AND GYNECOLOGY | Facility: HOSPITAL | Age: 29
End: 2024-04-30
Payer: COMMERCIAL

## 2024-04-30 DIAGNOSIS — Z34.90 TERM PREGNANCY (HHS-HCC): Primary | ICD-10-CM

## 2024-04-30 RX ORDER — ONDANSETRON HYDROCHLORIDE 2 MG/ML
4 INJECTION, SOLUTION INTRAVENOUS EVERY 6 HOURS PRN
Status: CANCELLED | OUTPATIENT
Start: 2024-04-30

## 2024-04-30 RX ORDER — LABETALOL HYDROCHLORIDE 5 MG/ML
20 INJECTION, SOLUTION INTRAVENOUS ONCE AS NEEDED
Status: CANCELLED | OUTPATIENT
Start: 2024-04-30

## 2024-04-30 RX ORDER — TRANEXAMIC ACID 100 MG/ML
1000 INJECTION, SOLUTION INTRAVENOUS ONCE AS NEEDED
Status: CANCELLED | OUTPATIENT
Start: 2024-04-30

## 2024-04-30 RX ORDER — METOCLOPRAMIDE 5 MG/1
10 TABLET ORAL EVERY 6 HOURS PRN
Status: CANCELLED | OUTPATIENT
Start: 2024-04-30

## 2024-04-30 RX ORDER — CARBOPROST TROMETHAMINE 250 UG/ML
250 INJECTION, SOLUTION INTRAMUSCULAR ONCE AS NEEDED
Status: CANCELLED | OUTPATIENT
Start: 2024-04-30

## 2024-04-30 RX ORDER — NIFEDIPINE 10 MG/1
10 CAPSULE ORAL ONCE AS NEEDED
Status: CANCELLED | OUTPATIENT
Start: 2024-04-30

## 2024-04-30 RX ORDER — MISOPROSTOL 100 UG/1
800 TABLET ORAL ONCE AS NEEDED
Status: CANCELLED | OUTPATIENT
Start: 2024-04-30

## 2024-04-30 RX ORDER — LOPERAMIDE HYDROCHLORIDE 2 MG/1
4 CAPSULE ORAL EVERY 2 HOUR PRN
Status: CANCELLED | OUTPATIENT
Start: 2024-04-30

## 2024-04-30 RX ORDER — HYDRALAZINE HYDROCHLORIDE 20 MG/ML
5 INJECTION INTRAMUSCULAR; INTRAVENOUS ONCE AS NEEDED
Status: CANCELLED | OUTPATIENT
Start: 2024-04-30

## 2024-04-30 RX ORDER — METOCLOPRAMIDE HYDROCHLORIDE 5 MG/ML
10 INJECTION INTRAMUSCULAR; INTRAVENOUS EVERY 6 HOURS PRN
Status: CANCELLED | OUTPATIENT
Start: 2024-04-30

## 2024-04-30 RX ORDER — METHYLERGONOVINE MALEATE 0.2 MG/ML
0.2 INJECTION INTRAVENOUS ONCE AS NEEDED
Status: CANCELLED | OUTPATIENT
Start: 2024-04-30

## 2024-04-30 RX ORDER — OXYTOCIN 10 [USP'U]/ML
10 INJECTION, SOLUTION INTRAMUSCULAR; INTRAVENOUS ONCE AS NEEDED
Status: CANCELLED | OUTPATIENT
Start: 2024-04-30

## 2024-04-30 RX ORDER — TERBUTALINE SULFATE 1 MG/ML
0.25 INJECTION SUBCUTANEOUS ONCE AS NEEDED
Status: CANCELLED | OUTPATIENT
Start: 2024-04-30

## 2024-04-30 RX ORDER — SODIUM CHLORIDE, SODIUM LACTATE, POTASSIUM CHLORIDE, CALCIUM CHLORIDE 600; 310; 30; 20 MG/100ML; MG/100ML; MG/100ML; MG/100ML
125 INJECTION, SOLUTION INTRAVENOUS CONTINUOUS
Status: CANCELLED | OUTPATIENT
Start: 2024-04-30

## 2024-04-30 RX ORDER — LIDOCAINE HYDROCHLORIDE 10 MG/ML
30 INJECTION INFILTRATION; PERINEURAL ONCE AS NEEDED
Status: CANCELLED | OUTPATIENT
Start: 2024-04-30

## 2024-04-30 RX ORDER — ONDANSETRON 4 MG/1
4 TABLET, FILM COATED ORAL EVERY 6 HOURS PRN
Status: CANCELLED | OUTPATIENT
Start: 2024-04-30

## 2024-04-30 NOTE — PROGRESS NOTES
Subjective   Patient ID [id number]   Geeta Reynaga is a 29 y.o.  at 40w5d     Her pregnancy is complicated by:  Hx GHTN  Anxiety    Objective   Physical Exam  Weight: 101 kg (221 lb 12.8 oz)  BP: 108/80  Fetal Heart Rate: 120 Fundal Height (cm): 39 cm  Cervix unchanged /-2  NST reactive.    Lab Results   Component Value Date    HGB 13.3 2024    HCT 38.9 2024       Assessment/Plan   Induction tonight. Questions answered.

## 2024-05-01 NOTE — H&P
Obstetrical Admission History and Physical     Geeta Reynaga is a 29 y.o.  at 40w3d. TERRENCE: 2024, by Ultrasound. Estimated fetal weight: 7 lbs 8 oz. She has had prenatal care with [facility] .    Chief Complaint: No chief complaint on file.    Assessment/Plan    Admit to L&D  IVF, CEFM, minimal PO/clear liquids  CBC, T&S  Cytotec followed by Pitocin per protocol  May have epidural when requested  GBS neg    Active Problems:  There are no active Hospital Problems.      Pregnancy Problems (from 10/17/23 to present)       Problem Noted Resolved    28 weeks gestation of pregnancy ([facility]) 2024 by Jes Wood MD No    24 weeks gestation of pregnancy ([facility]) 2024 by Joey Mason MD No          Options for delivery have been discussed with the patient and she elects for an induction of labor.  Cervical ripening with cytotec, cervidil, other prostaglandin agents has been discussed.  Induction of labor with pitocin, amniotomy, cytotec, and cervical balloon have been discussed in detail. The risks, benefits, complications, alternatives, expected outcomes, potential problems during recuperation and recovery, and the risks of not performing the procedure were discussed with the patient. The patient stated understanding that the risks of delivery include, but are not limited to: death; reaction to medications; injury to bowel, bladder, ureters, uterus, cervix, vagina, and other pelvic and abdominal structures, infection; blood loss and possible need for transfusion; and potential need for surgery, including hysterectomy. The risks of injury to the infant during delivery were also discussed. All questions were answered. There was concurrence with the planned procedure, and the patient wanted to proceed.    Admit to inpatient status. I anticipate that this patient will require a stay exceeding at least 2 midnights for delivery and postpartum.  Induction of labor.  Management of pregnancy complications, as  indicated.    Subjective   Good fetal movement. Denies vaginal bleeding., Denies contractions., Denies leaking of fluid.       Reason for Induction of Labor:  Pregnancy at 39 weeks or greater for induction    History of GHTN in both G1 and G2  Anxiety controlled on Lexapro     Obstetrical History   OB History    Para Term  AB Living   3 2 2 0 0 2   SAB IAB Ectopic Multiple Live Births   0 0 0 0 2      # Outcome Date GA Lbr Shin/2nd Weight Sex Delivery Anes PTL Lv   3 Current            2 Term 21 39w0d   F Vag-Spont EPI N OLGA   1 Term 20 40w0d  3.26 kg M Vag-Spont EPI N OLGA       Past Medical History  Past Medical History:   Diagnosis Date    Encounter for supervision of normal pregnancy, unspecified, second trimester ([facility])     Encounter for pregnancy related examination in second trimester    Generalized anxiety disorder 2016    Generalized anxiety disorder    Personal history of other infectious and parasitic diseases     History of chicken pox    Personal history of other specified conditions 2014    History of syncope        Past Surgical History   Past Surgical History:   Procedure Laterality Date    MOUTH SURGERY  2014    Oral Surgery Tooth Extraction       Social History  Social History     Tobacco Use    Smoking status: Never    Smokeless tobacco: Never   Substance Use Topics    Alcohol use: Not Currently     Substance and Sexual Activity   Drug Use Not Currently    Types: Marijuana       Allergies  Patient has no known allergies.     Medications  (Not in a hospital admission)      Objective    Last Vitals  Temp Pulse Resp BP MAP O2 Sat                   Physical Examination  GENERAL: Examination reveals a well developed, well nourished, gravid female in no acute distress. She is alert and cooperative.  LUNGS: clear to auscultation bilaterally  HEART: regular rate and rhythm, S1, S2 normal, no murmur, click, rub or gallop  ABDOMEN: soft, gravid, nontender,  nondistended, no abnormal masses, no epigastric pain  FHR is 130  [place] reading:  quiet  The fetus is in a vertex presentation, determined by ultrasound  Current Estimated Fetal Weight 7 lbs 8 oz established by Leopold's maneuver  CERVIX:  1/60/-3 ; MEMBRANES are  in tact  EXTREMITIES: no redness or tenderness in the calves or thighs, no edema  NEUROLOGICAL: alert, oriented, normal speech, no focal findings or movement disorder noted  PSYCHOLOGICAL: awake and alert; oriented to person, place, and time    Lab Review  Lab Results   Component Value Date    GRPBSTREP No Group B Streptococcus (GBS) isolated 04/04/2024   Nu Lozano, DO

## 2024-05-02 ENCOUNTER — PROCEDURE VISIT (OUTPATIENT)
Dept: OBSTETRICS AND GYNECOLOGY | Facility: CLINIC | Age: 29
End: 2024-05-02
Payer: COMMERCIAL

## 2024-05-02 ENCOUNTER — APPOINTMENT (OUTPATIENT)
Dept: OBSTETRICS AND GYNECOLOGY | Facility: HOSPITAL | Age: 29
End: 2024-05-02
Payer: COMMERCIAL

## 2024-05-02 ENCOUNTER — PREP FOR PROCEDURE (OUTPATIENT)
Dept: OBSTETRICS AND GYNECOLOGY | Facility: CLINIC | Age: 29
End: 2024-05-02

## 2024-05-02 ENCOUNTER — HOSPITAL ENCOUNTER (INPATIENT)
Facility: HOSPITAL | Age: 29
LOS: 3 days | Discharge: HOME | End: 2024-05-05
Attending: OBSTETRICS & GYNECOLOGY | Admitting: OBSTETRICS & GYNECOLOGY
Payer: COMMERCIAL

## 2024-05-02 VITALS — SYSTOLIC BLOOD PRESSURE: 108 MMHG | DIASTOLIC BLOOD PRESSURE: 80 MMHG | WEIGHT: 221.8 LBS | BODY MASS INDEX: 32.75 KG/M2

## 2024-05-02 DIAGNOSIS — Z34.80 SUPERVISION OF OTHER NORMAL PREGNANCY, ANTEPARTUM (HHS-HCC): ICD-10-CM

## 2024-05-02 DIAGNOSIS — Z34.90 TERM PREGNANCY (HHS-HCC): ICD-10-CM

## 2024-05-02 DIAGNOSIS — O48.0 POST-TERM PREGNANCY, 40-42 WEEKS OF GESTATION (HHS-HCC): Primary | ICD-10-CM

## 2024-05-02 DIAGNOSIS — Z34.90 ENCOUNTER FOR INDUCTION OF LABOR (HHS-HCC): ICD-10-CM

## 2024-05-02 LAB
ABO GROUP (TYPE) IN BLOOD: NORMAL
ANTIBODY SCREEN: NORMAL
ERYTHROCYTE [DISTWIDTH] IN BLOOD BY AUTOMATED COUNT: 13.1 % (ref 11.5–14.5)
HCT VFR BLD AUTO: 36.9 % (ref 36–46)
HGB BLD-MCNC: 12.9 G/DL (ref 12–16)
MCH RBC QN AUTO: 30.6 PG (ref 26–34)
MCHC RBC AUTO-ENTMCNC: 35 G/DL (ref 32–36)
MCV RBC AUTO: 88 FL (ref 80–100)
NRBC BLD-RTO: 0 /100 WBCS (ref 0–0)
PLATELET # BLD AUTO: 189 X10*3/UL (ref 150–450)
POC GLUCOSE, URINE: NEGATIVE MG/DL
POC PROTEIN, URINE: ABNORMAL MG/DL
RBC # BLD AUTO: 4.21 X10*6/UL (ref 4–5.2)
RH FACTOR (ANTIGEN D): NORMAL
WBC # BLD AUTO: 9.1 X10*3/UL (ref 4.4–11.3)

## 2024-05-02 PROCEDURE — 36415 COLL VENOUS BLD VENIPUNCTURE: CPT | Performed by: OBSTETRICS & GYNECOLOGY

## 2024-05-02 PROCEDURE — 99213 OFFICE O/P EST LOW 20 MIN: CPT | Performed by: OBSTETRICS & GYNECOLOGY

## 2024-05-02 PROCEDURE — 1120000001 HC OB PRIVATE ROOM DAILY

## 2024-05-02 PROCEDURE — 85027 COMPLETE CBC AUTOMATED: CPT | Performed by: OBSTETRICS & GYNECOLOGY

## 2024-05-02 PROCEDURE — 86780 TREPONEMA PALLIDUM: CPT | Mod: GEALAB | Performed by: OBSTETRICS & GYNECOLOGY

## 2024-05-02 PROCEDURE — 81003 URINALYSIS AUTO W/O SCOPE: CPT | Performed by: OBSTETRICS & GYNECOLOGY

## 2024-05-02 PROCEDURE — 2500000001 HC RX 250 WO HCPCS SELF ADMINISTERED DRUGS (ALT 637 FOR MEDICARE OP): Performed by: OBSTETRICS & GYNECOLOGY

## 2024-05-02 PROCEDURE — 2500000004 HC RX 250 GENERAL PHARMACY W/ HCPCS (ALT 636 FOR OP/ED): Performed by: OBSTETRICS & GYNECOLOGY

## 2024-05-02 PROCEDURE — 59025 FETAL NON-STRESS TEST: CPT | Performed by: OBSTETRICS & GYNECOLOGY

## 2024-05-02 PROCEDURE — 59050 FETAL MONITOR W/REPORT: CPT

## 2024-05-02 PROCEDURE — 86901 BLOOD TYPING SEROLOGIC RH(D): CPT | Performed by: OBSTETRICS & GYNECOLOGY

## 2024-05-02 RX ORDER — CARBOPROST TROMETHAMINE 250 UG/ML
250 INJECTION, SOLUTION INTRAMUSCULAR ONCE AS NEEDED
Status: DISCONTINUED | OUTPATIENT
Start: 2024-05-02 | End: 2024-05-05 | Stop reason: HOSPADM

## 2024-05-02 RX ORDER — METOCLOPRAMIDE HYDROCHLORIDE 5 MG/ML
10 INJECTION INTRAMUSCULAR; INTRAVENOUS EVERY 6 HOURS PRN
Status: DISCONTINUED | OUTPATIENT
Start: 2024-05-02 | End: 2024-05-05 | Stop reason: HOSPADM

## 2024-05-02 RX ORDER — OXYTOCIN/0.9 % SODIUM CHLORIDE 30/500 ML
2-30 PLASTIC BAG, INJECTION (ML) INTRAVENOUS CONTINUOUS
Status: DISCONTINUED | OUTPATIENT
Start: 2024-05-02 | End: 2024-05-05 | Stop reason: HOSPADM

## 2024-05-02 RX ORDER — TRANEXAMIC ACID 100 MG/ML
1000 INJECTION, SOLUTION INTRAVENOUS ONCE AS NEEDED
Status: DISCONTINUED | OUTPATIENT
Start: 2024-05-02 | End: 2024-05-05 | Stop reason: HOSPADM

## 2024-05-02 RX ORDER — SODIUM CHLORIDE, SODIUM LACTATE, POTASSIUM CHLORIDE, CALCIUM CHLORIDE 600; 310; 30; 20 MG/100ML; MG/100ML; MG/100ML; MG/100ML
125 INJECTION, SOLUTION INTRAVENOUS CONTINUOUS
Status: DISCONTINUED | OUTPATIENT
Start: 2024-05-02 | End: 2024-05-05 | Stop reason: HOSPADM

## 2024-05-02 RX ORDER — OXYTOCIN 10 [USP'U]/ML
10 INJECTION, SOLUTION INTRAMUSCULAR; INTRAVENOUS ONCE AS NEEDED
Status: DISCONTINUED | OUTPATIENT
Start: 2024-05-02 | End: 2024-05-05 | Stop reason: HOSPADM

## 2024-05-02 RX ORDER — NIFEDIPINE 10 MG/1
10 CAPSULE ORAL ONCE AS NEEDED
Status: DISCONTINUED | OUTPATIENT
Start: 2024-05-02 | End: 2024-05-05 | Stop reason: HOSPADM

## 2024-05-02 RX ORDER — CITALOPRAM 20 MG/1
20 TABLET, FILM COATED ORAL DAILY
Status: CANCELLED | OUTPATIENT
Start: 2024-05-03

## 2024-05-02 RX ORDER — OXYTOCIN/0.9 % SODIUM CHLORIDE 30/500 ML
60 PLASTIC BAG, INJECTION (ML) INTRAVENOUS ONCE AS NEEDED
Status: COMPLETED | OUTPATIENT
Start: 2024-05-02 | End: 2024-05-03

## 2024-05-02 RX ORDER — LOPERAMIDE HYDROCHLORIDE 2 MG/1
4 CAPSULE ORAL EVERY 2 HOUR PRN
Status: DISCONTINUED | OUTPATIENT
Start: 2024-05-02 | End: 2024-05-05 | Stop reason: HOSPADM

## 2024-05-02 RX ORDER — LABETALOL HYDROCHLORIDE 5 MG/ML
20 INJECTION, SOLUTION INTRAVENOUS ONCE AS NEEDED
Status: DISCONTINUED | OUTPATIENT
Start: 2024-05-02 | End: 2024-05-05 | Stop reason: HOSPADM

## 2024-05-02 RX ORDER — LIDOCAINE HYDROCHLORIDE 10 MG/ML
30 INJECTION INFILTRATION; PERINEURAL ONCE AS NEEDED
Status: DISCONTINUED | OUTPATIENT
Start: 2024-05-02 | End: 2024-05-05 | Stop reason: HOSPADM

## 2024-05-02 RX ORDER — HYDRALAZINE HYDROCHLORIDE 20 MG/ML
5 INJECTION INTRAMUSCULAR; INTRAVENOUS ONCE AS NEEDED
Status: DISCONTINUED | OUTPATIENT
Start: 2024-05-02 | End: 2024-05-05 | Stop reason: HOSPADM

## 2024-05-02 RX ORDER — METHYLERGONOVINE MALEATE 0.2 MG/ML
0.2 INJECTION INTRAVENOUS ONCE AS NEEDED
Status: DISCONTINUED | OUTPATIENT
Start: 2024-05-02 | End: 2024-05-05 | Stop reason: HOSPADM

## 2024-05-02 RX ORDER — METOCLOPRAMIDE 10 MG/1
10 TABLET ORAL EVERY 6 HOURS PRN
Status: DISCONTINUED | OUTPATIENT
Start: 2024-05-02 | End: 2024-05-05 | Stop reason: HOSPADM

## 2024-05-02 RX ORDER — ONDANSETRON 4 MG/1
4 TABLET, FILM COATED ORAL EVERY 6 HOURS PRN
Status: DISCONTINUED | OUTPATIENT
Start: 2024-05-02 | End: 2024-05-05 | Stop reason: HOSPADM

## 2024-05-02 RX ORDER — MISOPROSTOL 200 UG/1
800 TABLET ORAL ONCE AS NEEDED
Status: DISCONTINUED | OUTPATIENT
Start: 2024-05-02 | End: 2024-05-05 | Stop reason: HOSPADM

## 2024-05-02 RX ORDER — TERBUTALINE SULFATE 1 MG/ML
0.25 INJECTION SUBCUTANEOUS ONCE AS NEEDED
Status: DISCONTINUED | OUTPATIENT
Start: 2024-05-02 | End: 2024-05-05 | Stop reason: HOSPADM

## 2024-05-02 RX ORDER — ONDANSETRON HYDROCHLORIDE 2 MG/ML
4 INJECTION, SOLUTION INTRAVENOUS EVERY 6 HOURS PRN
Status: DISCONTINUED | OUTPATIENT
Start: 2024-05-02 | End: 2024-05-05 | Stop reason: HOSPADM

## 2024-05-02 RX ADMIN — MISOPROSTOL 25 MCG: 100 TABLET ORAL at 22:08

## 2024-05-02 RX ADMIN — SODIUM CHLORIDE, POTASSIUM CHLORIDE, SODIUM LACTATE AND CALCIUM CHLORIDE 125 ML/HR: 600; 310; 30; 20 INJECTION, SOLUTION INTRAVENOUS at 21:45

## 2024-05-02 SDOH — ECONOMIC STABILITY: HOUSING INSECURITY: DO YOU FEEL UNSAFE GOING BACK TO THE PLACE WHERE YOU ARE LIVING?: NO

## 2024-05-02 SDOH — SOCIAL STABILITY: SOCIAL INSECURITY: ABUSE SCREEN: ADULT

## 2024-05-02 SDOH — SOCIAL STABILITY: SOCIAL INSECURITY: ARE YOU OR HAVE YOU BEEN THREATENED OR ABUSED PHYSICALLY, EMOTIONALLY, OR SEXUALLY BY ANYONE?: NO

## 2024-05-02 SDOH — SOCIAL STABILITY: SOCIAL INSECURITY: HAS ANYONE EVER THREATENED TO HURT YOUR FAMILY OR YOUR PETS?: NO

## 2024-05-02 SDOH — SOCIAL STABILITY: SOCIAL INSECURITY: PHYSICAL ABUSE: DENIES

## 2024-05-02 SDOH — SOCIAL STABILITY: SOCIAL INSECURITY: DOES ANYONE TRY TO KEEP YOU FROM HAVING/CONTACTING OTHER FRIENDS OR DOING THINGS OUTSIDE YOUR HOME?: NO

## 2024-05-02 SDOH — HEALTH STABILITY: MENTAL HEALTH: NON-SPECIFIC ACTIVE SUICIDAL THOUGHTS (PAST 1 MONTH): NO

## 2024-05-02 SDOH — HEALTH STABILITY: MENTAL HEALTH: WERE YOU ABLE TO COMPLETE ALL THE BEHAVIORAL HEALTH SCREENINGS?: YES

## 2024-05-02 SDOH — SOCIAL STABILITY: SOCIAL INSECURITY: ARE THERE ANY APPARENT SIGNS OF INJURIES/BEHAVIORS THAT COULD BE RELATED TO ABUSE/NEGLECT?: NO

## 2024-05-02 SDOH — SOCIAL STABILITY: SOCIAL INSECURITY: VERBAL ABUSE: DENIES

## 2024-05-02 SDOH — HEALTH STABILITY: MENTAL HEALTH: HAVE YOU USED ANY PRESCRIPTION DRUGS OTHER THAN PRESCRIBED IN THE PAST 12 MONTHS?: NO

## 2024-05-02 SDOH — SOCIAL STABILITY: SOCIAL INSECURITY: HAVE YOU HAD ANY THOUGHTS OF HARMING ANYONE ELSE?: NO

## 2024-05-02 SDOH — SOCIAL STABILITY: SOCIAL INSECURITY: DO YOU FEEL ANYONE HAS EXPLOITED OR TAKEN ADVANTAGE OF YOU FINANCIALLY OR OF YOUR PERSONAL PROPERTY?: NO

## 2024-05-02 SDOH — HEALTH STABILITY: MENTAL HEALTH: SUICIDAL BEHAVIOR (LIFETIME): NO

## 2024-05-02 SDOH — HEALTH STABILITY: MENTAL HEALTH: HAVE YOU USED ANY SUBSTANCES (CANABIS, COCAINE, HEROIN, HALLUCINOGENS, INHALANTS, ETC.) IN THE PAST 12 MONTHS?: NO

## 2024-05-02 SDOH — SOCIAL STABILITY: SOCIAL INSECURITY: HAVE YOU HAD THOUGHTS OF HARMING ANYONE ELSE?: NO

## 2024-05-02 SDOH — HEALTH STABILITY: MENTAL HEALTH: WISH TO BE DEAD (PAST 1 MONTH): NO

## 2024-05-02 ASSESSMENT — PATIENT HEALTH QUESTIONNAIRE - PHQ9
1. LITTLE INTEREST OR PLEASURE IN DOING THINGS: NOT AT ALL
SUM OF ALL RESPONSES TO PHQ9 QUESTIONS 1 & 2: 0
2. FEELING DOWN, DEPRESSED OR HOPELESS: NOT AT ALL

## 2024-05-02 ASSESSMENT — LIFESTYLE VARIABLES
HOW OFTEN DO YOU HAVE 6 OR MORE DRINKS ON ONE OCCASION: NEVER
SKIP TO QUESTIONS 9-10: 1
AUDIT-C TOTAL SCORE: 0
AUDIT-C TOTAL SCORE: 0
HOW OFTEN DO YOU HAVE A DRINK CONTAINING ALCOHOL: NEVER
HOW MANY STANDARD DRINKS CONTAINING ALCOHOL DO YOU HAVE ON A TYPICAL DAY: PATIENT DOES NOT DRINK

## 2024-05-02 ASSESSMENT — PAIN SCALES - GENERAL
PAINLEVEL_OUTOF10: 0 - NO PAIN

## 2024-05-02 ASSESSMENT — ACTIVITIES OF DAILY LIVING (ADL): LACK_OF_TRANSPORTATION: PATIENT DECLINED

## 2024-05-03 ENCOUNTER — ANESTHESIA (OUTPATIENT)
Dept: OBSTETRICS AND GYNECOLOGY | Facility: HOSPITAL | Age: 29
End: 2024-05-03
Payer: COMMERCIAL

## 2024-05-03 ENCOUNTER — ANESTHESIA EVENT (OUTPATIENT)
Dept: OBSTETRICS AND GYNECOLOGY | Facility: HOSPITAL | Age: 29
End: 2024-05-03
Payer: COMMERCIAL

## 2024-05-03 LAB
ALBUMIN SERPL BCP-MCNC: 3.6 G/DL (ref 3.4–5)
ALP SERPL-CCNC: 114 U/L (ref 33–110)
ALT SERPL W P-5'-P-CCNC: 7 U/L (ref 7–45)
ANION GAP SERPL CALC-SCNC: 17 MMOL/L (ref 10–20)
AST SERPL W P-5'-P-CCNC: 12 U/L (ref 9–39)
BILIRUB SERPL-MCNC: 0.8 MG/DL (ref 0–1.2)
BUN SERPL-MCNC: 15 MG/DL (ref 6–23)
CALCIUM SERPL-MCNC: 9.2 MG/DL (ref 8.6–10.3)
CHLORIDE SERPL-SCNC: 102 MMOL/L (ref 98–107)
CO2 SERPL-SCNC: 19 MMOL/L (ref 21–32)
CREAT SERPL-MCNC: 0.81 MG/DL (ref 0.5–1.05)
CREAT UR-MCNC: 68.2 MG/DL (ref 20–320)
EGFRCR SERPLBLD CKD-EPI 2021: >90 ML/MIN/1.73M*2
GLUCOSE SERPL-MCNC: 59 MG/DL (ref 74–99)
LDH SERPL L TO P-CCNC: 149 U/L (ref 84–246)
POTASSIUM SERPL-SCNC: 3.6 MMOL/L (ref 3.5–5.3)
PROT SERPL-MCNC: 6.8 G/DL (ref 6.4–8.2)
PROT UR-ACNC: 11 MG/DL (ref 5–24)
PROT/CREAT UR: 0.16 MG/MG CREAT (ref 0–0.17)
SODIUM SERPL-SCNC: 134 MMOL/L (ref 136–145)
TREPONEMA PALLIDUM IGG+IGM AB [PRESENCE] IN SERUM OR PLASMA BY IMMUNOASSAY: NONREACTIVE
URATE SERPL-MCNC: 6.3 MG/DL (ref 2.3–6.7)

## 2024-05-03 PROCEDURE — 59409 OBSTETRICAL CARE: CPT | Performed by: OBSTETRICS & GYNECOLOGY

## 2024-05-03 PROCEDURE — 2500000006 HC RX 250 W HCPCS SELF ADMINISTERED DRUGS (ALT 637 FOR ALL PAYERS): Performed by: OBSTETRICS & GYNECOLOGY

## 2024-05-03 PROCEDURE — 88307 TISSUE EXAM BY PATHOLOGIST: CPT | Mod: TC,GEALAB | Performed by: OBSTETRICS & GYNECOLOGY

## 2024-05-03 PROCEDURE — 7100000016 HC LABOR RECOVERY PER HOUR

## 2024-05-03 PROCEDURE — 80053 COMPREHEN METABOLIC PANEL: CPT | Performed by: OBSTETRICS & GYNECOLOGY

## 2024-05-03 PROCEDURE — 2500000001 HC RX 250 WO HCPCS SELF ADMINISTERED DRUGS (ALT 637 FOR MEDICARE OP): Performed by: OBSTETRICS & GYNECOLOGY

## 2024-05-03 PROCEDURE — 3700000014 EPIDURAL BLOCK: Performed by: NURSE ANESTHETIST, CERTIFIED REGISTERED

## 2024-05-03 PROCEDURE — 82570 ASSAY OF URINE CREATININE: CPT | Performed by: OBSTETRICS & GYNECOLOGY

## 2024-05-03 PROCEDURE — 1120000001 HC OB PRIVATE ROOM DAILY

## 2024-05-03 PROCEDURE — 84550 ASSAY OF BLOOD/URIC ACID: CPT | Performed by: OBSTETRICS & GYNECOLOGY

## 2024-05-03 PROCEDURE — 83615 LACTATE (LD) (LDH) ENZYME: CPT | Performed by: OBSTETRICS & GYNECOLOGY

## 2024-05-03 PROCEDURE — 01967 NEURAXL LBR ANES VAG DLVR: CPT | Performed by: NURSE ANESTHETIST, CERTIFIED REGISTERED

## 2024-05-03 PROCEDURE — 7210000002 HC LABOR PER HOUR

## 2024-05-03 PROCEDURE — 2500000004 HC RX 250 GENERAL PHARMACY W/ HCPCS (ALT 636 FOR OP/ED): Mod: JZ | Performed by: OBSTETRICS & GYNECOLOGY

## 2024-05-03 PROCEDURE — 88307 TISSUE EXAM BY PATHOLOGIST: CPT | Performed by: PATHOLOGY

## 2024-05-03 PROCEDURE — 2500000004 HC RX 250 GENERAL PHARMACY W/ HCPCS (ALT 636 FOR OP/ED): Performed by: NURSE ANESTHETIST, CERTIFIED REGISTERED

## 2024-05-03 PROCEDURE — 51701 INSERT BLADDER CATHETER: CPT

## 2024-05-03 PROCEDURE — 2500000004 HC RX 250 GENERAL PHARMACY W/ HCPCS (ALT 636 FOR OP/ED): Performed by: OBSTETRICS & GYNECOLOGY

## 2024-05-03 PROCEDURE — 0UQGXZZ REPAIR VAGINA, EXTERNAL APPROACH: ICD-10-PCS | Performed by: OBSTETRICS & GYNECOLOGY

## 2024-05-03 PROCEDURE — 59410 OBSTETRICAL CARE: CPT | Performed by: OBSTETRICS & GYNECOLOGY

## 2024-05-03 PROCEDURE — 3E033VJ INTRODUCTION OF OTHER HORMONE INTO PERIPHERAL VEIN, PERCUTANEOUS APPROACH: ICD-10-PCS | Performed by: OBSTETRICS & GYNECOLOGY

## 2024-05-03 RX ORDER — ONDANSETRON 4 MG/1
4 TABLET, FILM COATED ORAL EVERY 6 HOURS PRN
Status: DISCONTINUED | OUTPATIENT
Start: 2024-05-03 | End: 2024-05-05 | Stop reason: HOSPADM

## 2024-05-03 RX ORDER — CARBOPROST TROMETHAMINE 250 UG/ML
250 INJECTION, SOLUTION INTRAMUSCULAR ONCE AS NEEDED
Status: DISCONTINUED | OUTPATIENT
Start: 2024-05-03 | End: 2024-05-05 | Stop reason: HOSPADM

## 2024-05-03 RX ORDER — LOPERAMIDE HYDROCHLORIDE 2 MG/1
4 CAPSULE ORAL EVERY 2 HOUR PRN
Status: DISCONTINUED | OUTPATIENT
Start: 2024-05-03 | End: 2024-05-05 | Stop reason: HOSPADM

## 2024-05-03 RX ORDER — OXYTOCIN/0.9 % SODIUM CHLORIDE 30/500 ML
60 PLASTIC BAG, INJECTION (ML) INTRAVENOUS ONCE AS NEEDED
Status: DISCONTINUED | OUTPATIENT
Start: 2024-05-03 | End: 2024-05-05 | Stop reason: HOSPADM

## 2024-05-03 RX ORDER — IBUPROFEN 600 MG/1
600 TABLET ORAL EVERY 6 HOURS
Status: DISCONTINUED | OUTPATIENT
Start: 2024-05-03 | End: 2024-05-05 | Stop reason: HOSPADM

## 2024-05-03 RX ORDER — OXYTOCIN 10 [USP'U]/ML
10 INJECTION, SOLUTION INTRAMUSCULAR; INTRAVENOUS ONCE AS NEEDED
Status: DISCONTINUED | OUTPATIENT
Start: 2024-05-03 | End: 2024-05-05 | Stop reason: HOSPADM

## 2024-05-03 RX ORDER — BUPIVACAINE HYDROCHLORIDE 2.5 MG/ML
INJECTION, SOLUTION EPIDURAL; INFILTRATION; INTRACAUDAL AS NEEDED
Status: DISCONTINUED | OUTPATIENT
Start: 2024-05-03 | End: 2024-05-03

## 2024-05-03 RX ORDER — CALCIUM CARBONATE 200(500)MG
1000 TABLET,CHEWABLE ORAL DAILY
Status: DISCONTINUED | OUTPATIENT
Start: 2024-05-03 | End: 2024-05-03

## 2024-05-03 RX ORDER — ACETAMINOPHEN 325 MG/1
975 TABLET ORAL EVERY 6 HOURS
Status: DISCONTINUED | OUTPATIENT
Start: 2024-05-03 | End: 2024-05-05 | Stop reason: HOSPADM

## 2024-05-03 RX ORDER — ENOXAPARIN SODIUM 100 MG/ML
40 INJECTION SUBCUTANEOUS EVERY 24 HOURS
Status: DISCONTINUED | OUTPATIENT
Start: 2024-05-03 | End: 2024-05-05 | Stop reason: HOSPADM

## 2024-05-03 RX ORDER — NIFEDIPINE 30 MG/1
30 TABLET, FILM COATED, EXTENDED RELEASE ORAL
Status: DISCONTINUED | OUTPATIENT
Start: 2024-05-03 | End: 2024-05-05 | Stop reason: HOSPADM

## 2024-05-03 RX ORDER — LABETALOL HYDROCHLORIDE 5 MG/ML
20 INJECTION, SOLUTION INTRAVENOUS ONCE AS NEEDED
Status: DISCONTINUED | OUTPATIENT
Start: 2024-05-03 | End: 2024-05-05 | Stop reason: HOSPADM

## 2024-05-03 RX ORDER — NIFEDIPINE 10 MG/1
10 CAPSULE ORAL ONCE AS NEEDED
Status: DISCONTINUED | OUTPATIENT
Start: 2024-05-03 | End: 2024-05-05 | Stop reason: HOSPADM

## 2024-05-03 RX ORDER — POLYETHYLENE GLYCOL 3350 17 G/17G
17 POWDER, FOR SOLUTION ORAL 2 TIMES DAILY PRN
Status: DISCONTINUED | OUTPATIENT
Start: 2024-05-03 | End: 2024-05-05 | Stop reason: HOSPADM

## 2024-05-03 RX ORDER — FENTANYL/ROPIVACAINE/NS/PF 2MCG/ML-.2
0-25 PLASTIC BAG, INJECTION (ML) INJECTION CONTINUOUS
Status: DISCONTINUED | OUTPATIENT
Start: 2024-05-03 | End: 2024-05-03

## 2024-05-03 RX ORDER — METHYLERGONOVINE MALEATE 0.2 MG/ML
0.2 INJECTION INTRAVENOUS ONCE AS NEEDED
Status: DISCONTINUED | OUTPATIENT
Start: 2024-05-03 | End: 2024-05-05 | Stop reason: HOSPADM

## 2024-05-03 RX ORDER — MISOPROSTOL 200 UG/1
800 TABLET ORAL ONCE AS NEEDED
Status: DISCONTINUED | OUTPATIENT
Start: 2024-05-03 | End: 2024-05-05 | Stop reason: HOSPADM

## 2024-05-03 RX ORDER — HYDRALAZINE HYDROCHLORIDE 20 MG/ML
5 INJECTION INTRAMUSCULAR; INTRAVENOUS ONCE AS NEEDED
Status: DISCONTINUED | OUTPATIENT
Start: 2024-05-03 | End: 2024-05-05 | Stop reason: HOSPADM

## 2024-05-03 RX ORDER — FENTANYL/ROPIVACAINE/NS/PF 2MCG/ML-.2
PLASTIC BAG, INJECTION (ML) INJECTION
Status: COMPLETED
Start: 2024-05-03 | End: 2024-05-03

## 2024-05-03 RX ORDER — CEFAZOLIN SODIUM 2 G/100ML
2 INJECTION, SOLUTION INTRAVENOUS ONCE
Status: COMPLETED | OUTPATIENT
Start: 2024-05-03 | End: 2024-05-03

## 2024-05-03 RX ORDER — SIMETHICONE 80 MG
80 TABLET,CHEWABLE ORAL 4 TIMES DAILY PRN
Status: DISCONTINUED | OUTPATIENT
Start: 2024-05-03 | End: 2024-05-05 | Stop reason: HOSPADM

## 2024-05-03 RX ORDER — BISACODYL 10 MG/1
10 SUPPOSITORY RECTAL DAILY PRN
Status: DISCONTINUED | OUTPATIENT
Start: 2024-05-03 | End: 2024-05-05 | Stop reason: HOSPADM

## 2024-05-03 RX ORDER — ADHESIVE BANDAGE
10 BANDAGE TOPICAL
Status: DISCONTINUED | OUTPATIENT
Start: 2024-05-03 | End: 2024-05-05 | Stop reason: HOSPADM

## 2024-05-03 RX ORDER — ONDANSETRON HYDROCHLORIDE 2 MG/ML
4 INJECTION, SOLUTION INTRAVENOUS EVERY 6 HOURS PRN
Status: DISCONTINUED | OUTPATIENT
Start: 2024-05-03 | End: 2024-05-05 | Stop reason: HOSPADM

## 2024-05-03 RX ORDER — DIPHENHYDRAMINE HYDROCHLORIDE 50 MG/ML
25 INJECTION INTRAMUSCULAR; INTRAVENOUS EVERY 6 HOURS PRN
Status: DISCONTINUED | OUTPATIENT
Start: 2024-05-03 | End: 2024-05-05 | Stop reason: HOSPADM

## 2024-05-03 RX ORDER — LIDOCAINE 560 MG/1
1 PATCH PERCUTANEOUS; TOPICAL; TRANSDERMAL
Status: DISCONTINUED | OUTPATIENT
Start: 2024-05-03 | End: 2024-05-05 | Stop reason: HOSPADM

## 2024-05-03 RX ORDER — TRANEXAMIC ACID 100 MG/ML
1000 INJECTION, SOLUTION INTRAVENOUS ONCE AS NEEDED
Status: DISCONTINUED | OUTPATIENT
Start: 2024-05-03 | End: 2024-05-05 | Stop reason: HOSPADM

## 2024-05-03 RX ORDER — DIPHENHYDRAMINE HCL 25 MG
25 CAPSULE ORAL EVERY 6 HOURS PRN
Status: DISCONTINUED | OUTPATIENT
Start: 2024-05-03 | End: 2024-05-05 | Stop reason: HOSPADM

## 2024-05-03 RX ADMIN — BUPIVACAINE HYDROCHLORIDE 2 ML: 2.5 INJECTION, SOLUTION EPIDURAL; INFILTRATION; INTRACAUDAL; PERINEURAL at 02:15

## 2024-05-03 RX ADMIN — MISOPROSTOL 25 MCG: 100 TABLET ORAL at 01:10

## 2024-05-03 RX ADMIN — ACETAMINOPHEN 975 MG: 325 TABLET ORAL at 05:35

## 2024-05-03 RX ADMIN — IBUPROFEN 600 MG: 600 TABLET, FILM COATED ORAL at 20:07

## 2024-05-03 RX ADMIN — BUPIVACAINE HYDROCHLORIDE 2 ML: 2.5 INJECTION, SOLUTION EPIDURAL; INFILTRATION; INTRACAUDAL; PERINEURAL at 02:19

## 2024-05-03 RX ADMIN — ACETAMINOPHEN 975 MG: 325 TABLET ORAL at 20:07

## 2024-05-03 RX ADMIN — IBUPROFEN 600 MG: 600 TABLET, FILM COATED ORAL at 05:35

## 2024-05-03 RX ADMIN — BUPIVACAINE HYDROCHLORIDE 2 ML: 2.5 INJECTION, SOLUTION EPIDURAL; INFILTRATION; INTRACAUDAL; PERINEURAL at 02:17

## 2024-05-03 RX ADMIN — ENOXAPARIN SODIUM 40 MG: 40 INJECTION SUBCUTANEOUS at 20:07

## 2024-05-03 RX ADMIN — CALCIUM CARBONATE (ANTACID) CHEW TAB 500 MG 1000 MG: 500 CHEW TAB at 04:01

## 2024-05-03 RX ADMIN — CEFAZOLIN SODIUM 2 G: 2 INJECTION, SOLUTION INTRAVENOUS at 05:35

## 2024-05-03 RX ADMIN — Medication 60 MILLI-UNITS/MIN: at 04:54

## 2024-05-03 RX ADMIN — Medication 10 ML/HR: at 02:21

## 2024-05-03 RX ADMIN — NIFEDIPINE 30 MG: 30 TABLET, EXTENDED RELEASE ORAL at 14:21

## 2024-05-03 SDOH — HEALTH STABILITY: MENTAL HEALTH: CURRENT SMOKER: 0

## 2024-05-03 ASSESSMENT — PAIN SCALES - GENERAL
PAINLEVEL_OUTOF10: 1
PAIN_LEVEL: 0
PAINLEVEL_OUTOF10: 5 - MODERATE PAIN
PAINLEVEL_OUTOF10: 0 - NO PAIN
PAINLEVEL_OUTOF10: 0 - NO PAIN
PAINLEVEL_OUTOF10: 5 - MODERATE PAIN
PAINLEVEL_OUTOF10: 9
PAINLEVEL_OUTOF10: 0 - NO PAIN
PAINLEVEL_OUTOF10: 7
PAINLEVEL_OUTOF10: 0 - NO PAIN
PAINLEVEL_OUTOF10: 0 - NO PAIN

## 2024-05-03 ASSESSMENT — PAIN DESCRIPTION - DESCRIPTORS: DESCRIPTORS: CRAMPING

## 2024-05-03 NOTE — LACTATION NOTE
This note was copied from a baby's chart.  Lactation Consultant Note  Lactation Consultation  Reason for Consult: Follow-up assessment  Consultant Name: BANDAR Mcknight RN, CBS    Maternal Information  Has mother  before?: Yes  How long did the mother previously breastfeed?: 18 months  Previous Maternal Breastfeeding Challenges: Infant separation, Difficult latch (Mother states she was unable to latch until her son was 4 days old due to NICU stay.)  Infant to breast within first 2 hours of birth?: Yes  Exclusive Pump and Bottle Feed: No    Maternal Assessment  Breast Assessment: Large, Soft, Warm, Compressible, Symmetrical (toned)  Nipple Assessment: Intact, Erect, Rounded after feeding, Sore  [place] Assessment: Normal    Infant Assessment  Infant Behavior: Fussy, Feeding cues observed, Rooting response, Suckles on and off, needs stimulation, Active alert  Infant Assessment:  (40.5 weeks, approximately 14 HOL)    Feeding Assessment  Nutrition Source: Breastmilk  Feeding Method: Nursing at the breast, Feeding expressed breastmilk, Syringe feeding  Feeding Position: Breast sandwich, Cross - cradle, Football/seated, Skin to skin, Both sides, Nipple to nose, Mother demonstrates good positioning  Suck/Feeding: Sustained, Unsustained, Coordinated suck/swallow/breathe, Tactile stimulation needed  Latch Assessment: Deep latch obtained, Optimal angle of mouth opening, Latch is painful, Sucking and swallowing, Chin and lower lip contact breast first, Flanged lips, Chin moves in rhythmic motion, Comfortable latch, Nipple - slurping/pulls/nibbles, Latch achieved after repeated attempts    LATCH TOOL  Latch: Repeated attempts, hold nipple in mouth, stimulate to suck  Audible Swallowing: A few with stimulation  Type of Nipple: Everted (After stimulation)  Comfort (Breast/Nipple): Filling, red/small blisters/bruises, mild/moderate discomfort  Hold (Positioning): No assist from staff, mother able to position/hold infant  LATCH  Score: 7    Breast Pump  Pump: Hand expression (Mother has breast pump for home use.)  Frequency:  (as needed)  Duration: 15-20 minutes per session  Volume of Milk Production: 1.7  Units of Volume: mL per session    Other OB Lactation Tools  Lactation Tools: Lanolin    Patient Follow-up  Inpatient Lactation Follow-up Needed : Yes  Outpatient Lactation Follow-up: Recommended    Other OB Lactation Documentation  Maternal Risk Factors: Hypertension  Infant Risk Factors: High birth weight >3600 g    Recommendations/Summary  30 y/o  experienced breastfeeding mother with vaginal delivery of  girl approximately 14 hours ago. Mother's pregnancy notable for GHTN. Mother plans to breastfeed this  for as long as possible and  her other two children for 18 months. Mother states she had minor issue with latching her daughter and was unable to latch her son for 4 days due to him going to the NICU after delivery. Mother states she otherwise had no difficulties with breastfeeding. Mother does have a breast pump at home.     LC to bedside to assess breastfeeding progress and review education. Mother states she was just about to syringe feed colostrum that she hand expressed. Mother states she tried latching  but  would not sustain the latch. [place] eager and showing feeding cues. LC offered to have mother attempt latching again and LC will assist if needed. Mother agreeable. Mother independently positioning and latching  to the right breast in cross cradle hold with breast shaping. Mother showing correct positioning of  and hand placement for breast shaping.  eager and latching quickly but unable to sustain the latch for more than several sucks. Mother states her nipples are sore due to  unlatching so frequently. After several minutes,  did sustain the latch but still unlatched frequently. Deep latch observed with lips flanged and long jaw movements. Mother  states latch is comfortable. After approximately 10 minutes,  began unlatching again more frequently so LC encouraged mother to place  to her chest to burp before attempting to latch  to the left breast. Mother able to do so. Mother independently positioning  to the left breast in cross cradle with breast shaping. Mother states this side is more difficult to position  and  also seems reluctant to latch in this position. LC encouraged mother to try latching in football hold. Mother doing so independently and after several attempts, a deep latch was obtained. [place] still unlatching intermittently but sustaining for longer periods than initial attempt. Mother states latch is comfortable.  continued to nurse for approximately 5 more minutes at this breast before pushing away. Mother burping  before offering EBM. Mother asking LC to assist with finger feeding colostrum via syringe. LC assisting and  tolerated well. [place] more content at this time but LC encouraged mother to latch  again should  begin showing feeding cues. Mother states understanding. Mother placing  skin to skin.    Education reviewed at this time. Reviewed milk production and normal  feeding patterns in the first 24 hours, specifically cluster feeding. Reviewed feeding cues, waking techniques and signs to know  is eating enough. Reviewed signs of a deep and comfortable latch. Reviewed frequency of feeds and importance of feeding  every 3 hours from the beginning of the previous feed or earlier with feeding cues. Mother states understanding. Offered ongoing assistance with breastfeeding. Mother denies further questions or concerns at this time.

## 2024-05-03 NOTE — CARE PLAN
Problem: Vaginal Birth or  Section  Goal: Fetal and maternal status remain reassuring during the birth process  Outcome: Met  Goal: Tolerate CRB for IOL placement maintenance until dislodgement/removal 12hrs after placement  Outcome: Met  Goal: Prevention of malpresentation/labor dystocia through delivery  Outcome: Met  Goal: Demonstrates labor coping techniques through delivery  Outcome: Met  Goal: Minimal s/sx of HDP and BP<160/110  Outcome: Met  Goal: No s/sx of infection through recovery  Outcome: Met  Goal: No s/sx of hemorrhage through recovery  Outcome: Met

## 2024-05-03 NOTE — ANESTHESIA PREPROCEDURE EVALUATION
Patient: Geeta Reynaga    Evaluation Method: In-person visit    Procedure Information    Date: 05/03/24  Procedure: Labor Analgesia         Relevant Problems   Neuro   (+) RONA (generalized anxiety disorder)   (+) Moderate recurrent major depression (Multi)      Endocrine   (+) Obesity in pregnancy ([facility])      ID   (+) Other viral warts   (+) Tinea pedis      Skin   (+) Dyshidrosis (pompholyx)      GYN   (+) 24 weeks gestation of pregnancy ([facility])   (+) 28 weeks gestation of pregnancy ([facility])       Clinical information reviewed:   Tobacco  Allergies  Meds  Problems  Med Hx  Surg Hx   Fam Hx  Soc   Hx        NPO Detail:  No data recorded     OB/Gyn Evaluation    Present Pregnancy    Patient is pregnant now.   Obstetric History                Physical Exam    Airway  Mallampati: II  TM distance: >3 FB  Neck ROM: full     Cardiovascular   Rate: normal     Dental - normal exam     Pulmonary    Abdominal            Anesthesia Plan    History of general anesthesia?: no  History of complications of general anesthesia?: no    ASA 2     epidural     The patient is not a current smoker.  Patient was not previously instructed to abstain from smoking on day of procedure.  Patient did not smoke on day of procedure.    Anesthetic plan and risks discussed with patient.    Plan discussed with CRNA.

## 2024-05-03 NOTE — ANESTHESIA PROCEDURE NOTES
Epidural Block    Patient location during procedure: OB  Start time: 5/3/2024 1:50 AM  End time: 5/3/2024 2:05 AM  Reason for block: labor analgesia  Staffing  Performed: CRNA   Authorized by: LAVONNE Banda    Performed by: LAVONNE Banda    Preanesthetic Checklist  Completed: patient identified, IV checked, risks and benefits discussed, surgical consent, pre-op evaluation, timeout performed and sterile techniques followed  Block Timeout  RN/Licensed healthcare professional reads aloud to the Anesthesia provider and entire team: Patient identity, procedure with side and site, patient position, and as applicable the availability of implants/special equipment/special requirements.  Patient on coagulant treatment: no  Timeout performed at: 5/3/2024 1:50 AM  Block Placement  Patient position: sitting  Prep: ChloraPrep  Sterility prep: drape, gloves and mask  Sedation level: no sedation  Patient monitoring: blood pressure, continuous pulse oximetry and heart rate  Approach: midline  Local numbing: lidocaine 1% to skin and subcutaneous tissues  Vertebral space: lumbar  Lumbar location: L4-L5  Epidural  Loss of resistance technique: saline  Guidance: landmark technique        Needle  Needle type: Tuohy   Needle gauge: 18  Catheter type: multi-orifice  Catheter size: 20 G  Catheter at skin depth: 12 cm  Catheter securement method: clear occlusive dressing    Test dose: lidocaine 1.5% with epinephrine 1-to-200,000  Test dose: lidocaine 1.5% with epinephrine 1-to-200,000  Test dose result: no positive test dose            Assessment  Block outcome: pain improved  Number of attempts: 2  Events: no positive test dose  Procedure assessment: patient tolerated procedure well with no immediate complications

## 2024-05-03 NOTE — ANESTHESIA POSTPROCEDURE EVALUATION
Patient: Geeta Reynaga    Procedure Summary       Date: 05/03/24 Room / Location:     Anesthesia Start: 0150 Anesthesia Stop: 0434    Procedure: Labor Analgesia Diagnosis:     Scheduled Providers:  Responsible Provider: LAVONNE Banda    Anesthesia Type: epidural ASA Status: 2            Anesthesia Type: epidural    Vitals Value Taken Time   /76 05/03/24 1551   Temp 36.9 05/03/24 1551   Pulse 71 05/03/24 1551   Resp 22 05/03/24 1551   SpO2 98 05/03/24 1551       Anesthesia Post Evaluation    Patient location during evaluation: bedside  Patient participation: complete - patient participated  Level of consciousness: awake and alert  Pain score: 0  Pain management: adequate  Airway patency: patent  Cardiovascular status: acceptable  Respiratory status: acceptable  Hydration status: acceptable  Postoperative Nausea and Vomiting: none      No notable events documented.

## 2024-05-03 NOTE — L&D DELIVERY NOTE
OB Delivery Note  5/3/2024  Geeta Reynaga  29 y.o.   Vaginal, Spontaneous        Gestational Age: 40w6d  /Para:   Quantitative Blood Loss: Admission to Discharge: 550 mL (2024  9:15 PM - 5/3/2024  9:35 AM)    Geeta Reynaga Girl [[id number]]      Labor Events    Rupture date/time: 5/3/2024 0417  Rupture type: Spontaneous  Fluid color: Meconium  Fluid odor: None  Labor type: Induced Onset of Labor  Labor allowed to proceed with plans for an attempted vaginal birth?: Yes  Induction: Misoprostol  First cervical ripening date/time: 2024  Induction indications: Post-term Gestation  Complications: None       Labor Event Times    Labor onset date/time: 2024  Dilation complete date/time: 5/3/2024 0331  Start pushing date/time: 5/3/2024 0431       Labor Length    1st stage: 5h 23m  2nd stage: 1h 03m  3rd stage: 0h 16m       Placenta    Placenta delivery date/time: 5/3/2024 0450  Placenta removal: Spontaneous  Placenta appearance: Intact  Placenta disposition: discarded       Cord    Vessels: 3 vessels  Complications: None  Delayed cord clamping?: Yes  Cord blood disposition: Refrigerator  Gases sent?: No       Lacerations    Episiotomy: None  Vaginal laceration?: Yes  Repair suture: 3-0 Synthetic Suture       Anesthesia    Method: Epidural       Operative Delivery    Forceps attempted?: No  Vacuum extractor attempted?: No       Shoulder Dystocia    Shoulder dystocia present?: No       [place] Delivery    Birth date/time: 5/3/2024 04:34:00  Delivery type: Vaginal, Spontaneous  Complications: None       Resuscitation    Method: Tactile stimulation       Apgars    Living status: Living  Apgar Component Scores:  1 min.:  5 min.:  10 min.:  15 min.:  20 min.:    Skin color:  1  1       Heart rate:  2  2       Reflex irritability:  2  2       Muscle tone:  2  2       Respiratory effort:  2  2       Total:  9  9       Apgars assigned by: DIANN MCGRAW RN       Delivery Providers    Delivering  clinician: Jes Wood MD   Provider Role    Berenice Green, RN Delivery Nurse    Clara Law, RN Nursery Nurse               The patient presented to Labor and Delivery for induction of labor. The patient received 2 Cytotec and progress to complete. She was diagnosed with GHTN during labor. The patient pushed for 2 contractions and delivered by . Following delivery the infant was placed on the mother's chest for skin to skin care. Delayed cord clamping was done for 30 seconds, during which time the infant's nose and mouth were suctioned. The cord was clamped and cut. The placenta would not deliver after 15 minutes and the uterus was ad down. A Manual extraction was performed with some difficulty. The placenta appeared to be completely removed and the cavity felt clear. The patient will be given a dose of Ancef. A small left vaginal laceration was noted and repaired using 3-0 Vicryl suture in the usual fashion. Hemostasis of the laceration was noted. The fundus was expressed and found to be firm and below the umbilicus. Postpartum bleeding was found to be appropriate.   QBL 400cc. All counts were correct.    Jes Wood MD

## 2024-05-03 NOTE — LACTATION NOTE
This note was copied from a baby's chart.  Lactation Consultant Note  Lactation Consultation  Reason for Consult: Initial assessment  Consultant Name: Ella paulino    Maternal Information  Has mother  before?: Yes  How long did the mother previously breastfeed?: A year and a half  Infant to breast within first 2 hours of birth?: Yes  Exclusive Pump and Bottle Feed: No  WIC Program: No    Maternal Assessment  Breast Assessment: Large, Symmetrical, Compressible, Breast changes observed in pregnancy, Soft  Nipple Assessment: Intact, Erect, Large diameter, Sore  [place] Assessment: Normal    Infant Assessment  Infant Behavior: Sleepy  Infant Assessment:  (Full term infant, approximately 10 HOL, adequate voids and stools)    Feeding Assessment  Nutrition Source: Breastmilk  Feeding Method: Nursing at the breast, Feeding expressed breastmilk, Syringe feeding  Feeding Position: Skin to skin, Baby's head too high over breast, Breast sandwich, Cross - cradle, Football/seated, Mother needs assistance with latch/positioning, Nipple to nose  Suck/Feeding: Unsustained, Content after feeding, Tactile stimulation needed  Latch Assessment: Reluctant, Minimal assistance is needed, Mouth not open wide enough, Clenched jaws    LATCH TOOL  Latch: Too sleepy or reluctant, no latch achieved  Audible Swallowing: None  Type of Nipple: Everted (After stimulation)  Comfort (Breast/Nipple): Filling, red/small blisters/bruises, mild/moderate discomfort  Hold (Positioning): Minimal assist, teach one side, mother does other, staff holds  LATCH Score: 4    Breast Pump  Pump: Hand expression  Frequency:  (as needed)  Volume of Milk Production: 2.4  Units of Volume: mL per session    Patient Follow-up  Inpatient Lactation Follow-up Needed : Yes    Other OB Lactation Documentation  Maternal Risk Factors: Hypertension    Recommendations/Summary  1215: 29 year old  experienced breastfeeding mother. Reports breastfeeding her other children  for a year and a half. Verbalizes goal of breastfeeding this infant for as long as possible. This pregnancy notable for GHTN.  Reports positive breast changes during pregnancy. Denies history of breast or nipple surgery. Mother has a breast pump for personal use and will return to work in a few months.     Met with parents for initial lactation consult to assess breastfeeding progress, to address any questions and/or concerns and to offer lactation assistance, support and education as needed and desired. Mother reports that infant fed for about 10 minutes with the first feeding and that she has struggled to get the infant to latch after that. Infant showing occasional hunger cues. Reviewed deep latch techniques and breast shaping. Mother able to hold infant belly to belly and nose to nipple. Infant will open mouth and achieve deep latch but is reluctant to begin sucking. Attempts were made on both breasts and then mother had visitors come in and would like to try again soon.     Breastfeeding education and support provided throughout consult. Parents provided with the opportunity to ask questions. All questions answered. See education flow sheet for detailed list of education topics covered. Reviewed importance of frequent skin to skin contact, waking techniques, infant stomach capacity, value of colostrum feeds and typical  feeding behaviors in the first 24 hours. Encouraged frequent skin to skin and nursing with cues and at least 8 times in the first 24 hours. Reviewed signs of adequate intake/output. Parents deny any further questions or concerns at this time. Offered ongoing breastfeeding assistance, support and education as needed and desired.    1400: LC back at the bedside to assess if infant ever achieved a latch. Mother is attempting to latch at this time. Infant is still reluctant despite all rousing techniques. Encouraged mother to hand express to entice infant. Again, infant would open mouth wide  but was reluctant to begin sucking. LC had infant suck on gloved finger. After a few minutes infant began sucking. Infant put back to breast but again is reluctant. After many unsuccessful attempts mother encouraged mother to do 15 minutes of hand expression and then syringe feed expressed milk. Mother agreeable.     1425: Mother expressed 2.4 mls which was then fed back via finger feeding with syringe. Infant was reluctant at first but then was actively sucking while receiving the supplementation. Infant content after feeding. Encouraged mother to offer the breast on demand or in two to three hours if infant is not showing interest. Parents verbalize understanding. Parents deny further questions at this time. Offered ongoing breastfeeding assistance and education as needed and desired.

## 2024-05-03 NOTE — H&P
Obstetrical Admission History and Physical     Geeta Reynaga is a 29 y.o.  at 40w3d. TERRENCE: 2024, by Ultrasound. Estimated fetal weight: 7 lbs 8 oz. She has had prenatal care with [facility] .     Chief Complaint: No chief complaint on file.     Assessment/Plan    Admit to L&D  IVF, CEFM, minimal PO/clear liquids  CBC, T&S  Cytotec followed by Pitocin per protocol  May have epidural when requested  GBS neg     Active Problems:  There are no active Hospital Problems.      Pregnancy Problems (from 10/17/23 to present)         Problem Noted Resolved     28 weeks gestation of pregnancy ([facility]) 2024 by Jes Wood MD No     24 weeks gestation of pregnancy ([facility]) 2024 by Joey Mason MD No             Options for delivery have been discussed with the patient and she elects for an induction of labor.  Cervical ripening with cytotec, cervidil, other prostaglandin agents has been discussed.  Induction of labor with pitocin, amniotomy, cytotec, and cervical balloon have been discussed in detail. The risks, benefits, complications, alternatives, expected outcomes, potential problems during recuperation and recovery, and the risks of not performing the procedure were discussed with the patient. The patient stated understanding that the risks of delivery include, but are not limited to: death; reaction to medications; injury to bowel, bladder, ureters, uterus, cervix, vagina, and other pelvic and abdominal structures, infection; blood loss and possible need for transfusion; and potential need for surgery, including hysterectomy. The risks of injury to the infant during delivery were also discussed. All questions were answered. There was concurrence with the planned procedure, and the patient wanted to proceed.     Admit to inpatient status. I anticipate that this patient will require a stay exceeding at least 2 midnights for delivery and postpartum.  Induction of labor.  Management of pregnancy  complications, as indicated.     Subjective   Good fetal movement. Denies vaginal bleeding., Denies contractions., Denies leaking of fluid.        Reason for Induction of Labor:  Pregnancy at 39 weeks or greater for induction     History of GHTN in both G1 and G2  Anxiety controlled on Lexapro  Obstetrical History                    OB History    Para Term  AB Living   3 2 2 0 0 2   SAB IAB Ectopic Multiple Live Births      0 0 0 0 2          # Outcome Date GA Lbr Shin/2nd Weight Sex Delivery Anes PTL Lv   3 Current                     2 Term 21 39w0d     F Vag-Spont EPI N OLGA   1 Term 20 40w0d   3.26 kg M Vag-Spont EPI N OLGA         Past Medical History  Medical History        Past Medical History:   Diagnosis Date    Encounter for supervision of normal pregnancy, unspecified, second trimester ([facility])       Encounter for pregnancy related examination in second trimester    Generalized anxiety disorder 2016     Generalized anxiety disorder    Personal history of other infectious and parasitic diseases       History of chicken pox    Personal history of other specified conditions 2014     History of syncope            Past Surgical History   Surgical History         Past Surgical History:   Procedure Laterality Date    MOUTH SURGERY   2014     Oral Surgery Tooth Extraction            Social History  Social History           Tobacco Use    Smoking status: Never    Smokeless tobacco: Never   Substance Use Topics    Alcohol use: Not Currently           Substance and Sexual Activity   Drug Use Not Currently    Types: Marijuana         Allergies  Patient has no known allergies.      Medications    Prescriptions Prior to Admission   (Not in a hospital admission)         Objective    Last Vitals  Temp Pulse Resp BP MAP O2 Sat              Physical Examination  GENERAL: Examination reveals a well developed, well nourished, gravid female in no acute distress. She is alert and  cooperative.  ABDOMEN: soft, gravid, nontender, nondistended, no abnormal masses, no epigastric pain  FHR is 130s, moderate variability, +accels, category 1  Hatton reading:  irregular contractions  The fetus is in a vertex presentation, determined by ultrasound  Current Estimated Fetal Weight 7 lbs 8 oz established by Leopold's maneuver  CERVIX:  2/70/-2 ; MEMBRANES are  in tact  EXTREMITIES: no redness or tenderness in the calves or thighs, no edema  NEUROLOGICAL: alert, oriented, normal speech, no focal findings or movement disorder noted  PSYCHOLOGICAL: awake and alert; oriented to person, place, and time     Lab Review        Lab Results   Component Value Date     GRPBSTREP No Group B Streptococcus (GBS) isolated 04/     First dose of cytotec 25 mcg placed vaginally    Donny Vázquez MD

## 2024-05-04 PROCEDURE — 2500000001 HC RX 250 WO HCPCS SELF ADMINISTERED DRUGS (ALT 637 FOR MEDICARE OP): Performed by: OBSTETRICS & GYNECOLOGY

## 2024-05-04 PROCEDURE — 2500000004 HC RX 250 GENERAL PHARMACY W/ HCPCS (ALT 636 FOR OP/ED): Performed by: OBSTETRICS & GYNECOLOGY

## 2024-05-04 PROCEDURE — 2500000006 HC RX 250 W HCPCS SELF ADMINISTERED DRUGS (ALT 637 FOR ALL PAYERS): Performed by: OBSTETRICS & GYNECOLOGY

## 2024-05-04 PROCEDURE — 1120000001 HC OB PRIVATE ROOM DAILY

## 2024-05-04 RX ADMIN — IBUPROFEN 600 MG: 600 TABLET, FILM COATED ORAL at 20:53

## 2024-05-04 RX ADMIN — IBUPROFEN 600 MG: 600 TABLET, FILM COATED ORAL at 08:15

## 2024-05-04 RX ADMIN — IBUPROFEN 600 MG: 600 TABLET, FILM COATED ORAL at 02:11

## 2024-05-04 RX ADMIN — IBUPROFEN 600 MG: 600 TABLET, FILM COATED ORAL at 14:10

## 2024-05-04 RX ADMIN — ACETAMINOPHEN 975 MG: 325 TABLET ORAL at 02:11

## 2024-05-04 RX ADMIN — ACETAMINOPHEN 975 MG: 325 TABLET ORAL at 14:10

## 2024-05-04 RX ADMIN — ACETAMINOPHEN 975 MG: 325 TABLET ORAL at 20:53

## 2024-05-04 RX ADMIN — ENOXAPARIN SODIUM 40 MG: 40 INJECTION SUBCUTANEOUS at 20:53

## 2024-05-04 RX ADMIN — ACETAMINOPHEN 975 MG: 325 TABLET ORAL at 08:15

## 2024-05-04 RX ADMIN — NIFEDIPINE 30 MG: 30 TABLET, EXTENDED RELEASE ORAL at 08:15

## 2024-05-04 ASSESSMENT — PAIN SCALES - GENERAL
PAINLEVEL_OUTOF10: 4
PAINLEVEL_OUTOF10: 4
PAINLEVEL_OUTOF10: 0 - NO PAIN
PAINLEVEL_OUTOF10: 4
PAINLEVEL_OUTOF10: 0 - NO PAIN

## 2024-05-04 ASSESSMENT — PAIN DESCRIPTION - DESCRIPTORS
DESCRIPTORS: SORE
DESCRIPTORS: SORE

## 2024-05-04 NOTE — PROGRESS NOTES
Postpartum Progress Note    Assessment/Plan   Geeta Reynaga is a 29 y.o., , who delivered at 40w6d gestation and is now postpartum day 1.    Patient doing well  Continue routine care  Ibuprofen/Tylenol for pain management  Regular diet  Encouraged ambulation  SCD/Lovenox for DVT prophylaxis      Principal Problem:    Term pregnancy ([facility])    Pregnancy Problems (from 10/17/23 to present)       Problem Noted Resolved    Term pregnancy ([facility]) 2024 by Nu Lozano DO No    Priority:  Medium      28 weeks gestation of pregnancy ([facility]) 2024 by Jes Wood MD No    Priority:  Medium      24 weeks gestation of pregnancy ([facility]) 2024 by Joey Mason MD No    Priority:  Medium            Hospital course: no complications      Subjective   Her pain is well controlled with current medications  She is passing flatus  She is ambulating well  She is tolerating a Adult diet Regular  She reports no breast or nursing problems  She denies emotional concerns today   Her plan for contraception will be discussed at [facility]         Objective   Allergies:   Patient has no known allergies.         Last Vitals:  Temp Pulse Resp BP MAP Pulse Ox   36.5 °C (97.7 °F) 65 16 116/65   97 %     Vitals Min/Max Last 24 Hours:  Temp  Min: 36.5 °C (97.7 °F)  Max: 36.9 °C (98.4 °F)  Pulse  Min: 65  Max: 80  Resp  Min: 16  Max: 18  BP  Min: 116/65  Max: 177/93    Intake/Output:   No intake or output data in the 24 hours ending 24 0752    Physical Exam:  General: Examination reveals a well developed, well nourished, female, in no acute distress. She is alert and cooperative.  Lungs: appropriate effort.  Fundus: firm, below umbilicus, and nontender.  Psychological: awake and alert; oriented to person, place, and time.    Lab Data:  Lab Results   Component Value Date    WBC 9.1 2024    HGB 12.9 2024    HCT 36.9 2024     2024   Nu Lozano DO

## 2024-05-04 NOTE — LACTATION NOTE
This note was copied from a baby's chart.  Lactation Consultant Note     24 1210   Lactation Consultation   Reason for Consult Follow-up assessment   Consultant Name Ella Padilla   Maternal Information   Has mother  before? Yes   How long did the mother previously breastfeed? a year and a half x2   Infant to breast within first 2 hours of birth? Yes   Exclusive Pump and Bottle Feed No   Maternal Assessment   Breast Assessment Large;Warm;Symmetrical;Breast changes observed in pregnancy   Nipple Assessment Intact;Erect   [place] Assessment Normal   Infant Assessment   Infant Behavior Sleepy   Infant Assessment   (Full term infant, approximately 31 HOL, adequate voids and stools 3%  weight loss)   Feeding Assessment   Nutrition Source Breastmilk   Feeding Method Nursing at the breast;Feeding expressed breastmilk;Syringe feeding   Feeding Position Breast sandwich;Nipple to nose;Skin to skin;Laid back;Mother demonstrates good positioning   Suck/Feeding Sustained;Tactile stimulation needed;Swallowing intermittently only with encouragement   Latch Assessment Minimal assistance is needed;Deep latch obtained;Comfortable with no pain;Bursts of sucking, swallowing, and rest;Comfortable latch;Too sleepy;Flanged lips   LATCH Tool   Latch 1   Audible Swallowing 1   Type of Nipple 2   Comfort (Breast/Nipple) 2   Hold (Positioning) 1   LATCH Score 7   Breast Pump   Pump Individual user electric pump   Volume of Milk Production 7   Units of Volume mL per session   Patient Follow-Up   Inpatient Lactation Follow-up Needed  Yes   Outpatient Lactation Follow-up Recommended   Other OB Lactation Documentation    Maternal Risk Factors Hypertension     Recommendations/Summary  29 year old  experienced breastfeeding mother. Met with parents for routine lactation consult to assess breastfeeding progress, to address any questions and/or concerns and to offer lactation assistance, support and education as needed and desired.  Mother reports that infant is sleepy and reluctant. Assisted mother with rousing techniques. Infant showing occasional hunger cues. Infant achieves deep latch in football hold and will suck for a few minutes and then fall asleep despite tactile stimulation. Infant switched to laid back positioning with a bit more success but infant still sleepy. Encouraged mother to use massage and compression to keep infant interested. Infant eventually reluctant and sleeping at the breast. Encouraged mother to do skin to skin and attempt again in about 15 minutes.     Mother reported that infant fed better on both breasts but still feels it was not a great feed. Mother used her personal breast pump and expressed about 7mls which she was going to syringe feed to infant. Encouraged mother to continue putting infant to the breast as much as possible.     Breastfeeding education and support provided throughout consult. Parents provided with the opportunity to ask questions. All questions answered. See education flow sheet for detailed list of education topics covered. Reviewed importance of frequent skin to skin contact, waking techniques, infant stomach capacity, value of colostrum feeds and typical  feeding behaviors on the second day of life. Encouraged frequent skin to skin and nursing with cues and at least 8 times in 24 hours. Reviewed signs of adequate intake/output. Parents deny any further questions or concerns at this time. Offered ongoing breastfeeding assistance, support and education as needed and desired.

## 2024-05-05 VITALS
WEIGHT: 224.54 LBS | TEMPERATURE: 97.5 F | HEIGHT: 69 IN | BODY MASS INDEX: 33.26 KG/M2 | OXYGEN SATURATION: 99 % | RESPIRATION RATE: 16 BRPM | SYSTOLIC BLOOD PRESSURE: 129 MMHG | HEART RATE: 75 BPM | DIASTOLIC BLOOD PRESSURE: 82 MMHG

## 2024-05-05 PROBLEM — Z3A.28 28 WEEKS GESTATION OF PREGNANCY (HHS-HCC): Status: RESOLVED | Noted: 2024-02-06 | Resolved: 2024-05-05

## 2024-05-05 PROBLEM — O35.9XX0 SUSPECTED FETAL ANOMALY, ANTEPARTUM (HHS-HCC): Status: RESOLVED | Noted: 2023-10-15 | Resolved: 2024-05-05

## 2024-05-05 PROBLEM — Z3A.24 24 WEEKS GESTATION OF PREGNANCY (HHS-HCC): Status: RESOLVED | Noted: 2024-01-09 | Resolved: 2024-05-05

## 2024-05-05 PROBLEM — O99.210 OBESITY IN PREGNANCY (HHS-HCC): Status: RESOLVED | Noted: 2023-10-15 | Resolved: 2024-05-05

## 2024-05-05 PROBLEM — O13.9 GESTATIONAL HYPERTENSION (HHS-HCC): Status: ACTIVE | Noted: 2024-05-05

## 2024-05-05 PROBLEM — E55.9 VITAMIN D DEFICIENCY: Status: RESOLVED | Noted: 2023-10-15 | Resolved: 2024-05-05

## 2024-05-05 PROCEDURE — 2500000001 HC RX 250 WO HCPCS SELF ADMINISTERED DRUGS (ALT 637 FOR MEDICARE OP): Performed by: OBSTETRICS & GYNECOLOGY

## 2024-05-05 PROCEDURE — 2500000006 HC RX 250 W HCPCS SELF ADMINISTERED DRUGS (ALT 637 FOR ALL PAYERS): Performed by: OBSTETRICS & GYNECOLOGY

## 2024-05-05 RX ORDER — ACETAMINOPHEN 325 MG/1
975 TABLET ORAL EVERY 6 HOURS
Start: 2024-05-05 | End: 2024-05-06 | Stop reason: ALTCHOICE

## 2024-05-05 RX ORDER — IBUPROFEN 600 MG/1
600 TABLET ORAL EVERY 6 HOURS
Start: 2024-05-05 | End: 2024-05-06 | Stop reason: ALTCHOICE

## 2024-05-05 RX ADMIN — ACETAMINOPHEN 975 MG: 325 TABLET ORAL at 05:45

## 2024-05-05 RX ADMIN — IBUPROFEN 600 MG: 600 TABLET, FILM COATED ORAL at 05:45

## 2024-05-05 RX ADMIN — NIFEDIPINE 30 MG: 30 TABLET, EXTENDED RELEASE ORAL at 08:53

## 2024-05-05 ASSESSMENT — PAIN SCALES - GENERAL
PAINLEVEL_OUTOF10: 4
PAINLEVEL_OUTOF10: 0 - NO PAIN

## 2024-05-05 NOTE — PROGRESS NOTES
Postpartum Progress Note    Assessment/Plan   Geeta Reynaga is a 29 y.o., , who delivered at 40w6d gestation and is now postpartum day 2.    Patient doing well  Continue routine care  Ibuprofen/Tylenol for pain management  BP normal range in last 12 hours and patient asymptomatic. Continue Nifedipine at current dose  Regular diet  Encouraged ambulation  SCD/Lovenox for DVT prophylaxis    Patient expresses desire for discharge today. She has been stable since starting Nifedipine and has a home BP cuff. She is willing/able to come to the office tomorrow for a BP check. Discharged home with strict precautions.    Principal Problem:    Term pregnancy ([facility])    Pregnancy Problems (from 10/17/23 to present)       Problem Noted Resolved    Term pregnancy ([facility]) 2024 by Nu Lozano DO No    Priority:  Medium      28 weeks gestation of pregnancy ([facility]) 2024 by Jes Wood MD No    Priority:  Medium      24 weeks gestation of pregnancy ([facility]) 2024 by Joey Mason MD No    Priority:  Medium            Hospital course: gestational hypertension      Subjective   Her pain is well controlled with current medications  She is passing flatus  She is ambulating well  She is tolerating a Adult diet Regular  She reports no breast or nursing problems  She denies emotional concerns today   Her plan for contraception will be discussed at postpartum visit.     She denies HA, change in vision, CP/SOB, RUQ pain.    Objective   Allergies:   Patient has no known allergies.         Last Vitals:  Temp Pulse Resp BP MAP Pulse Ox   36.5 °C (97.7 °F) 75 18 123/71   99 %     Vitals Min/Max Last 24 Hours:  Temp  Min: 36.5 °C (97.7 °F)  Max: 36.6 °C (97.9 °F)  Pulse  Min: 68  Max: 80  Resp  Min: 16  Max: 18  BP  Min: 123/71  Max: 131/89    Intake/Output:   No intake or output data in the 24 hours ending 24 0742    Physical Exam:  General: Examination reveals a well developed, well nourished,  female, in no acute distress. She is alert and cooperative.  Lungs: appropriate effort.  Fundus: firm, below umbilicus, and nontender.  Psychological: awake and alert; oriented to person, place, and time.    Lab Data:  Lab Results   Component Value Date    WBC 9.1 05/02/2024    HGB 12.9 05/02/2024    HCT 36.9 05/02/2024     05/02/2024     Lab Results   Component Value Date    GLUCOSE 59 (L) 05/03/2024     (L) 05/03/2024    K 3.6 05/03/2024     05/03/2024    CO2 19 (L) 05/03/2024    ANIONGAP 17 05/03/2024    BUN 15 05/03/2024    CREATININE 0.81 05/03/2024    EGFR >90 05/03/2024    CALCIUM 9.2 05/03/2024    ALBUMIN 3.6 05/03/2024    PROT 6.8 05/03/2024    ALKPHOS 114 (H) 05/03/2024    ALT 7 05/03/2024    AST 12 05/03/2024    BILITOT 0.8 05/03/2024   Nu Lozano, DO

## 2024-05-05 NOTE — DISCHARGE INSTRUCTIONS
"    [facility]  [address]  [address], [place] [address]  [address], [place] [address]  Telephone: [phone] [place] or [phone] [place]    After Discharge Orders:  Call the office and make an appointment for a blood pressure check 5/6/24 and a 6 week postpartum visit.      Medical equipment: Breast pump     Call the Physician with any severe blood pressure elevation signs and symptoms:    Warning signs regarding BP:  BP level of 160/110 or higher  Severe headache or headache that does not resolve with over the counter pain medication  Chest pain with or without shortness of breath  Visual changes, particularly loss of visual field     BP monitoring:  Monitor your BP at home with your home cuff twice daily and record your readings  Make sure you have been at rest and without stress, recent caffeine/nicotine when you take your BP readings. Your arm that is in the BP cuff should be roughly at the level of the heart and your feet flat on the floor  Follow up in the office in 1-2 days for a BP check with one of our medical assistants or nurses    After your delivery - signs and symptoms to watch for:  Fever - Oral temperature greater than 100.4 degrees Fahrenheit  Foul-smelling vaginal discharge  Headache unrelieved by \"pain meds\"  Difficulty urinating  Breasts reddened, hard, hot to the touch  Nipple discharge which is foul-smelling or contains pus  Increased pain at the site of the laceration  Difficulty breathing with or without chest pain  New calf pain especially if only on one side  Sudden, continuing increased vaginal bleeding with or without clots  Unrelieved feelings of:  Inability to cope  Sadness  Anxiety  Lack of interest in baby  Insomnia  Crying     What to do at home:  See patient education handouts for full information  Resume activity gradually   Don't lift anything heavier than baby and carrier until OK'd by your Physician  No sex until OK'd by " your Physician   Take care of yourself by sleeping/resting as much as possible  Eat regular nutritious meals  Let someone else care for you, your baby, and housework as much as possible   Take pain medication as prescribed whenever you need them  Wear compression stockings if prescribed   To avoid/relieve constipation take stool softeners if advised   Drink lots of water/fruit juices  Increase fiber in your diet  Breast care: Wear support bra ; use lanolin ointment/cream, nipple shields, or cool compresses as needed     Refer to  Discharge Instructions for problems or follow-up regarding  nursing

## 2024-05-05 NOTE — LACTATION NOTE
This note was copied from a baby's chart.  Lactation Consultant Note  Lactation Consultation  Reason for Consult: Follow-up assessment  Consultant Name: BANDAR Mcknight RN, CBS    Maternal Information  Has mother  before?: Yes  How long did the mother previously breastfeed?: 18 months with both children  Previous Maternal Breastfeeding Challenges: Infant separation (Mother states she was unable to latch for 4 days with her first child due to him going to NICU.)  Infant to breast within first 2 hours of birth?: Yes  Exclusive Pump and Bottle Feed: No  WIC Program: No    Maternal Assessment  Breast Assessment: Filling (Per mother, did not assess at this time.)  Nipple Assessment: Intact, Sore (per mother)    Infant Assessment  Infant Behavior: Sleepy, Sucking, Content after feeding  Infant Assessment:  (40.5 weeks, approximately 42 HOL)    Feeding Assessment  Nutrition Source: Breastmilk  Feeding Method: Nursing at the breast, Feeding expressed breastmilk, Syringe feeding, Supplemental nursing system  Unable to assess infant feeding at this time:  (Mother states  just finished nursing at the breast and is currently syringe feeding expressed colostrum.)    LATCH TOOL       Breast Pump  Pump: Individual user electric pump, Double breast pumping  Frequency: 5-7 times per day  Duration: 15-20 minutes per session  Volume of Milk Production: 12  Units of Volume: mL per session    Other OB Lactation Tools       Patient Follow-up  Inpatient Lactation Follow-up Needed :  (as needed prior to discharge)  Outpatient Lactation Follow-up: Recommended (as needed)    Other OB Lactation Documentation  Maternal Risk Factors: Hypertension  Infant Risk Factors: High birth weight >3600 g    Recommendations/Summary  28 y/o  experienced breastfeeding mother with vaginal delivery of  girl approximately 42 hours ago. Mother's history notable for GHTN. Mother  her other two children for 18 months and plans to  breastfeed this  for the same. Mother reports +breast changes during pregnancy and denies history of breast surgery. Mother has her pump at her bedside and will return to work but will take her  to work with her.     LC to bedside to assess breastfeeding progress and review education. Mother currently syringe feeding  with expressed colostrum. LC offered to review supplementation with tube and syringe due to volume of supplementation. Mother receptive and eager. LC brought tube and syringe to bedside and reviewed finger feeding. Tube syringe placed to the corner of 's mouth and  obtained 12 mLs without pressure placed on the syringe. Mother pleased and  content. Reviewed also using this method while  is latched to the breast. Mother states understanding. Mother states  has progressively been latching better with each feed. Mother states this last feed  nursed on the right breast for 20 minutes and although she had some difficulty latching to the left, ended up also nursing on the left breast for 5-10 minutes. Mother pleased. Mother states she is also pumping after most feeds due to her history of clogged ducts. Mother states her other newborns did not fully empty her, thus resulting in clogged ducts. Mother states  has not yet started cluster feeding and denies breakdown but states she does have soreness with the first minute of latching. Mother states she is feeling krishna today.    Education reviewed at this time. Reviewed benefits of breastfeeding, milk production, feeding cues and waking techniques. Reviewed signs of a deep and comfortable latch and how to tell  is eating adequately. Reviewed adequate intake and output. Reviewed cluster feeding and frequency of feeds. Reviewed when to begin pumping and cleaning of pump parts. Reviewed nipple care and how to prevent and treat engorgement, clogged ducts and mastitis. Mother states she  takes Celexa and will be taking a blood pressure medication. Mother declines literature as she states she has taken these medications while breastfeeding before.  Ongoing assistance offered prior to discharge. Mother denies questions or concerns at this time.

## 2024-05-06 ENCOUNTER — POSTPARTUM VISIT (OUTPATIENT)
Dept: OBSTETRICS AND GYNECOLOGY | Facility: CLINIC | Age: 29
End: 2024-05-06
Payer: COMMERCIAL

## 2024-05-06 VITALS — DIASTOLIC BLOOD PRESSURE: 80 MMHG | SYSTOLIC BLOOD PRESSURE: 150 MMHG | WEIGHT: 207 LBS | BODY MASS INDEX: 30.57 KG/M2

## 2024-05-06 DIAGNOSIS — Z87.59 HISTORY OF GESTATIONAL HYPERTENSION: Primary | ICD-10-CM

## 2024-05-06 PROCEDURE — 0503F POSTPARTUM CARE VISIT: CPT | Performed by: OBSTETRICS & GYNECOLOGY

## 2024-05-06 RX ORDER — NIFEDIPINE 30 MG/1
30 TABLET, FILM COATED, EXTENDED RELEASE ORAL
COMMUNITY

## 2024-05-06 ASSESSMENT — ENCOUNTER SYMPTOMS
MUSCULOSKELETAL NEGATIVE: 1
RESPIRATORY NEGATIVE: 1
HEMATOLOGIC/LYMPHATIC NEGATIVE: 1
GASTROINTESTINAL NEGATIVE: 1
CONSTITUTIONAL NEGATIVE: 1
CARDIOVASCULAR NEGATIVE: 1
NEUROLOGICAL NEGATIVE: 1
PSYCHIATRIC NEGATIVE: 1

## 2024-05-06 NOTE — PATIENT INSTRUCTIONS
Postpartum BP Check:  You were seen in office today for BP check postpartum  Women who have BP disorders of pregnancy generally improve postpartum, but can have temporary BP elevations up to 6 weeks after delivery  You BP log was reviewed today and your BP has been in the normal to mild range, which is appropriate at this time.   If you were started on antihypertensive medication following delivery, continue until your 6 week postpartum visit.   Monitor for signs of severe BP elevations including severe/persistent headache, sudden visual changes, chest pain, shortness of breath  Call the office immediately for any BP of 160/110 or higher or for any of the signs/symptoms listed above. [phone] ([place]) or [phone] ([place])

## 2024-05-06 NOTE — DISCHARGE SUMMARY
Discharge Summary    Admission Date: 2024  Discharge Date: 24    Discharge Diagnosis  Vaginal delivery ([facility])    Hospital Course  Delivery Date: 5/3/2024  4:34 AM   Delivery type: Vaginal, Spontaneous    GA at delivery: 40w6d  Outcome: Living   Anesthesia during delivery: Epidural   Intrapartum complications: None   Feeding method: Breastfeeding Status: Yes     Procedures:    Contraception at discharge: will be discussed at postpartum visit      Pertinent Physical Exam At Time of Discharge    General: Examination reveals a well developed, well nourished, female, in no acute distress. She is alert and cooperative.  Lungs: appropriate effort.  Fundus: firm, below umbilicus, and nontender.  Psychological: awake and alert; oriented to person, place, and time.    Discharge Meds     Your medication list        START taking these medications        Instructions Last Dose Given Next Dose Due   acetaminophen 325 mg tablet  Commonly known as: Tylenol      Take 3 tablets (975 mg) by mouth every 6 hours.       ibuprofen 600 mg tablet      Take 1 tablet (600 mg) by mouth every 6 hours.              CONTINUE taking these medications        Instructions Last Dose Given Next Dose Due   citalopram 20 mg tablet  Commonly known as: CeleXA      Take 1 tablet (20 mg) by mouth once daily.       Prenatal  mg-mcg tablet  Generic drug: PNV133-ferrous fumarate-FA                  STOP taking these medications      aspirin 81 mg EC tablet                  Where to Get Your Medications        Information about where to get these medications is not yet available    Ask your nurse or doctor about these medications  acetaminophen 325 mg tablet  ibuprofen 600 mg tablet          Complications Requiring Follow-Up  GHTN    Test Results Pending At Discharge  Pending Labs       Order Current Status    Surgical Pathology Exam - [facility] In process            Outpatient Follow-Up  Future Appointments   Date Time Provider Department  [place]   5/6/2024  3:00 PM Nu Lozano DO [id number]       I spent 30 minutes in the professional and overall care of this patient.      Nu Lozano DO

## 2024-05-06 NOTE — PROGRESS NOTES
Subjective   Patient ID [id number]      Geeta Reynaga is a 29 y.o.  who delivered at 40w6d by Vaginal, Spontaneous . She is here for her 1 week postpartum BP check.   Her delivery was complicated by GHTN. Her postpartum course has been uncomplicated. She is on Nifedipine XR 30 mg/day with normal to mild range BP at home. She denies HA, change in vision, CP/SOB.    Review of Systems   Constitutional: Negative.    Respiratory: Negative.     Cardiovascular: Negative.    Gastrointestinal: Negative.    Genitourinary: Negative.    Musculoskeletal: Negative.    Neurological: Negative.    Hematological: Negative.    Psychiatric/Behavioral: Negative.         Objective   Weight: 93.9 kg (207 lb)  BP: 150/80     Physical Exam  Constitutional:       Appearance: Normal appearance.   Cardiovascular:      Rate and Rhythm: Normal rate and regular rhythm.   Pulmonary:      Effort: Pulmonary effort is normal.      Breath sounds: Normal breath sounds.   Musculoskeletal:      Right lower leg: No edema.      Left lower leg: No edema.   Neurological:      Mental Status: She is alert.      Deep Tendon Reflexes: Reflexes normal.   Psychiatric:         Behavior: Behavior normal.         Assessment/Plan   Problem List Items Addressed This Visit             ICD-10-CM    History of gestational hypertension - Primary Z87.59    Seen for BP check.   Mild range BP/asymptomatic (patient did not take medication today  Continue Nifedipine at current dose   Si/Sx severe range BP discussed   Precautions given RTO 5 wk PPV   Nu Lozano DO

## 2024-05-07 LAB
LABORATORY COMMENT REPORT: NORMAL
PATH REPORT.COMMENTS IMP SPEC: NORMAL
PATH REPORT.FINAL DX SPEC: NORMAL
PATH REPORT.GROSS SPEC: NORMAL
PATH REPORT.RELEVANT HX SPEC: NORMAL
PATH REPORT.TOTAL CANCER: NORMAL

## 2024-06-13 NOTE — PROGRESS NOTES
Postpartum Progress Note    Assessment/Plan   Geeta Reynaga is a 29 y.o., , who delivered at 40w6d gestation and is now postpartum day 42.  -10-17-23 NEG Pap  -GHTN, stop Nifedipine.  -Progesterone only pill. Call when done breast feeding.    Active Problems:  There are no active Hospital Problems.    Pregnancy Problems (from 10/17/23 to present)       Problem Noted Resolved    Gestational hypertension ([facility]) 2024 by Nu Lozano, DO No    Priority:  Medium      Overview Signed 2024  7:48 AM by Nu Lozano,      Mild range BP during labor and postpartum  Started on Nifedipine XR 30 mg/day 5/3/24  Discharged home 24 plan for home BP monitoring and office BP check 24         Vaginal delivery ([facility]) 2024 by Nu Lozano,  No    Priority:  Medium      Overview Signed 2024  7:46 AM by Nu Lozano, DO      5/3/24. GHTN. PPV 6 weeks         28 weeks gestation of pregnancy ([facility]) 2024 by Jes Wood MD 2024 by Nu Lozano,     Priority:  Medium      24 weeks gestation of pregnancy ([facility]) 2024 by Joey Mason MD 2024 by Nu Lozano DO    Priority:  Medium            Hospital course: gestational hypertension       Subjective   Patient induced and diagnosed with GHTN during labor. Was on Nifedipine, ran out about 10 days ago. BP normal at home. Manual removal of placenta. Small left vaginal laceration. Breast feeding. No GDM.  Bled for 4 weeks.     Objective   Allergies:   Patient has no known allergies.         Last Vitals:  Temp Pulse Resp BP MAP Pulse Ox         124/64           Physical Exam:  General: Examination reveals a well developed, well nourished, female, in no acute distress. She is alert and cooperative.  Breasts: breasts appear normal for pregnancy, no suspicious masses, no skin or nipple changes or axillary nodes.  Abdomen: soft, non tender, non distended.  Fundus:  nontender.  Perineum: well healed.  Extremities: no redness or tenderness in the calves or thighs, no edema.  Psychological: awake and alert; oriented to person, place, and time.

## 2024-06-14 ENCOUNTER — APPOINTMENT (OUTPATIENT)
Dept: OBSTETRICS AND GYNECOLOGY | Facility: CLINIC | Age: 29
End: 2024-06-14
Payer: COMMERCIAL

## 2024-06-14 VITALS — WEIGHT: 195 LBS | DIASTOLIC BLOOD PRESSURE: 64 MMHG | SYSTOLIC BLOOD PRESSURE: 124 MMHG | BODY MASS INDEX: 28.8 KG/M2

## 2024-06-14 DIAGNOSIS — Z30.011 ENCOUNTER FOR INITIAL PRESCRIPTION OF CONTRACEPTIVE PILLS: Primary | ICD-10-CM

## 2024-06-14 RX ORDER — DROSPIRENONE 4 MG/1
4 TABLET, FILM COATED ORAL DAILY
Qty: 30 TABLET | Refills: 11 | Status: SHIPPED | OUTPATIENT
Start: 2024-06-14

## 2024-06-14 ASSESSMENT — EDINBURGH POSTNATAL DEPRESSION SCALE (EPDS)
I HAVE FELT SAD OR MISERABLE: NO, NOT AT ALL
I HAVE BEEN ABLE TO LAUGH AND SEE THE FUNNY SIDE OF THINGS: AS MUCH AS I ALWAYS COULD
I HAVE BEEN SO UNHAPPY THAT I HAVE BEEN CRYING: NO, NEVER
THINGS HAVE BEEN GETTING ON TOP OF ME: NO, MOST OF THE TIME I HAVE COPED QUITE WELL
I HAVE BEEN ANXIOUS OR WORRIED FOR NO GOOD REASON: HARDLY EVER
I HAVE BLAMED MYSELF UNNECESSARILY WHEN THINGS WENT WRONG: NOT VERY OFTEN
THE THOUGHT OF HARMING MYSELF HAS OCCURRED TO ME: NEVER
I HAVE FELT SCARED OR PANICKY FOR NO GOOD REASON: NO, NOT MUCH
I HAVE LOOKED FORWARD WITH ENJOYMENT TO THINGS: AS MUCH AS I EVER DID
TOTAL SCORE: 4
I HAVE BEEN SO UNHAPPY THAT I HAVE HAD DIFFICULTY SLEEPING: NOT AT ALL

## 2025-01-27 ENCOUNTER — APPOINTMENT (OUTPATIENT)
Dept: PRIMARY CARE | Facility: CLINIC | Age: 30
End: 2025-01-27
Payer: COMMERCIAL

## 2025-01-27 VITALS
HEIGHT: 69 IN | WEIGHT: 193 LBS | BODY MASS INDEX: 28.58 KG/M2 | SYSTOLIC BLOOD PRESSURE: 110 MMHG | DIASTOLIC BLOOD PRESSURE: 70 MMHG | OXYGEN SATURATION: 98 % | HEART RATE: 70 BPM | TEMPERATURE: 97 F

## 2025-01-27 DIAGNOSIS — F41.1 GAD (GENERALIZED ANXIETY DISORDER): Primary | ICD-10-CM

## 2025-01-27 PROCEDURE — 3008F BODY MASS INDEX DOCD: CPT | Performed by: FAMILY MEDICINE

## 2025-01-27 PROCEDURE — 3074F SYST BP LT 130 MM HG: CPT | Performed by: FAMILY MEDICINE

## 2025-01-27 PROCEDURE — 3078F DIAST BP <80 MM HG: CPT | Performed by: FAMILY MEDICINE

## 2025-01-27 PROCEDURE — 99213 OFFICE O/P EST LOW 20 MIN: CPT | Performed by: FAMILY MEDICINE

## 2025-01-27 PROCEDURE — 1036F TOBACCO NON-USER: CPT | Performed by: FAMILY MEDICINE

## 2025-01-27 ASSESSMENT — PROMIS GLOBAL HEALTH SCALE
EMOTIONAL_PROBLEMS: SOMETIMES
RATE_AVERAGE_FATIGUE: MILD
RATE_MENTAL_HEALTH: FAIR
CARRYOUT_SOCIAL_ACTIVITIES: GOOD
RATE_QUALITY_OF_LIFE: VERY GOOD
RATE_PHYSICAL_HEALTH: GOOD
RATE_SOCIAL_SATISFACTION: GOOD
RATE_AVERAGE_PAIN: 0
CARRYOUT_PHYSICAL_ACTIVITIES: COMPLETELY
RATE_GENERAL_HEALTH: VERY GOOD

## 2025-01-27 ASSESSMENT — ENCOUNTER SYMPTOMS
SHORTNESS OF BREATH: 0
COUGH: 0
SORE THROAT: 0
CHILLS: 0
FATIGUE: 0
FEVER: 0
DYSPHORIC MOOD: 0
ADENOPATHY: 0
AGITATION: 0

## 2025-01-27 ASSESSMENT — PATIENT HEALTH QUESTIONNAIRE - PHQ9
SUM OF ALL RESPONSES TO PHQ9 QUESTIONS 1 AND 2: 0
2. FEELING DOWN, DEPRESSED OR HOPELESS: NOT AT ALL
1. LITTLE INTEREST OR PLEASURE IN DOING THINGS: NOT AT ALL

## 2025-01-27 ASSESSMENT — PAIN SCALES - GENERAL: PAINLEVEL_OUTOF10: 0-NO PAIN

## 2025-01-27 ASSESSMENT — LIFESTYLE VARIABLES: HOW OFTEN DO YOU HAVE A DRINK CONTAINING ALCOHOL: MONTHLY OR LESS

## 2025-01-27 NOTE — PROGRESS NOTES
"Subjective   Patient ID: Geeta Reynaga is a 29 y.o. female who presents for Annual Exam.    HPI doing well overall, baby now 9 months still nursing on demand. Has gone back to work, hard time falling asleep sometimes and does feel more overwhelmed.     Review of Systems   Constitutional:  Negative for chills, fatigue and fever.   HENT:  Negative for congestion and sore throat.    Respiratory:  Negative for cough and shortness of breath.    Cardiovascular:  Negative for chest pain.   Hematological:  Negative for adenopathy.   Psychiatric/Behavioral:  Negative for agitation and dysphoric mood.        Objective   /70   Pulse 70   Temp 36.1 °C (97 °F)   Ht 1.753 m (5' 9\")   Wt 87.5 kg (193 lb)   SpO2 98%   BMI 28.50 kg/m²     Physical Exam  Constitutional:       General: She is not in acute distress.     Appearance: Normal appearance.   HENT:      Head: Normocephalic.      Right Ear: Tympanic membrane and ear canal normal.      Left Ear: Tympanic membrane and ear canal normal.      Nose: Nose normal. No congestion.      Mouth/Throat:      Mouth: Mucous membranes are moist.      Pharynx: Oropharynx is clear.   Eyes:      General:         Right eye: No discharge.         Left eye: No discharge.      Extraocular Movements: Extraocular movements intact.      Conjunctiva/sclera: Conjunctivae normal.      Pupils: Pupils are equal, round, and reactive to light.   Cardiovascular:      Rate and Rhythm: Normal rate and regular rhythm.      Heart sounds: Normal heart sounds. No murmur heard.  Pulmonary:      Effort: Pulmonary effort is normal.      Breath sounds: Normal breath sounds. No wheezing or rhonchi.   Abdominal:      General: There is no distension.      Palpations: Abdomen is soft. There is no mass.      Tenderness: There is no abdominal tenderness.   Musculoskeletal:         General: No swelling, tenderness or deformity.   Lymphadenopathy:      Cervical: No cervical adenopathy.   Skin:     Coloration: " Skin is not jaundiced.   Neurological:      General: No focal deficit present.      Mental Status: She is alert.      Cranial Nerves: No cranial nerve deficit.      Sensory: No sensory deficit.   Psychiatric:         Mood and Affect: Mood normal.         Assessment/Plan   Problem List Items Addressed This Visit             ICD-10-CM    RONA (generalized anxiety disorder) - Primary F41.1     Discussed changing citalopram to Wellbutrin or possibly adding low-dose Wellbutrin to the citalopram.  Will assess if that helps with multiple symptoms as well as side effects.  If not consider adding low-dose stimulant.  Also recommended seeing counselor/therapist for assistance with anxiety management.

## 2025-02-12 ENCOUNTER — OFFICE VISIT (OUTPATIENT)
Dept: URGENT CARE | Age: 30
End: 2025-02-12
Payer: COMMERCIAL

## 2025-02-12 VITALS
DIASTOLIC BLOOD PRESSURE: 79 MMHG | WEIGHT: 189 LBS | SYSTOLIC BLOOD PRESSURE: 116 MMHG | BODY MASS INDEX: 27.91 KG/M2 | HEART RATE: 59 BPM | TEMPERATURE: 97.9 F | RESPIRATION RATE: 20 BRPM | OXYGEN SATURATION: 97 %

## 2025-02-12 DIAGNOSIS — J01.90 ACUTE BACTERIAL SINUSITIS: Primary | ICD-10-CM

## 2025-02-12 DIAGNOSIS — B96.89 ACUTE BACTERIAL SINUSITIS: Primary | ICD-10-CM

## 2025-02-12 RX ORDER — AMOXICILLIN AND CLAVULANATE POTASSIUM 875; 125 MG/1; MG/1
875 TABLET, FILM COATED ORAL 2 TIMES DAILY
Qty: 20 TABLET | Refills: 0 | Status: SHIPPED | OUTPATIENT
Start: 2025-02-12

## 2025-02-12 ASSESSMENT — ENCOUNTER SYMPTOMS
FATIGUE: 1
RHINORRHEA: 1
SORE THROAT: 1
SINUS PRESSURE: 1
SINUS PAIN: 1

## 2025-02-12 NOTE — PROGRESS NOTES
Subjective   Patient ID: Geeta Reynaga is a 29 y.o. female. They present today with a chief complaint of Sinus Problem (Pt c/o sinus pressure,cough, congestion, for 5 days).    History of Present Illness  Patient presents today with a chief complaint of sinus Problem, sinus pressure,cough, congestion, for 5 days. Reports facial pain mostly on the left.Currently breastfeeding.       Past Medical History  Allergies as of 02/12/2025    (No Known Allergies)       (Not in a hospital admission)       Past Medical History:   Diagnosis Date    Encounter for supervision of normal pregnancy, unspecified, second trimester     Encounter for pregnancy related examination in second trimester    Generalized anxiety disorder 12/02/2016    Generalized anxiety disorder    Personal history of other infectious and parasitic diseases     History of chicken pox    Personal history of other specified conditions 07/16/2014    History of syncope       Past Surgical History:   Procedure Laterality Date    MOUTH SURGERY  07/16/2014    Oral Surgery Tooth Extraction        reports that she has never smoked. She has never used smokeless tobacco. She reports that she does not currently use alcohol. She reports that she does not currently use drugs after having used the following drugs: Marijuana.    Review of Systems  Review of Systems   Constitutional:  Positive for fatigue.   HENT:  Positive for congestion, postnasal drip, rhinorrhea, sinus pressure, sinus pain and sore throat.    All other systems reviewed and are negative.                                 Objective    Vitals:    02/12/25 1104   BP: 116/79   BP Location: Left arm   Patient Position: Sitting   BP Cuff Size: Large adult   Pulse: 59   Resp: 20   Temp: 36.6 °C (97.9 °F)   TempSrc: Oral   SpO2: 97%   Weight: 85.7 kg (189 lb)     No LMP recorded.    Physical Exam  Vitals reviewed.   General: Vitals Noted. No distress. Normocephalic.  Cardiovascular:     Heart sounds: Normal heart  sounds, S1 normal and S2 normal. No murmur heard.     No friction rub.   Pulmonary:      Effort: Pulmonary effort is normal.      Breath sounds: Normal breath sounds and air entry. Lungs clear to auscultation bilaterally   HEENT: Left and right TM is within normal limits with  visible landmarks.  No drainage.  EOMI, normal conjunctiva, patent nares, Normal OP Noted maxillary and left frontal sinus tenderness on palpation is present. Pharynx and tonsils are hyperemic, and without exudate.   Neck: Supple with no adenopathy.  Lymphadenopathy:   No cervical adenopathy on palpation  Lower Body: No right inguinal adenopathy. No left inguinal adenopathy.   Abdominal:      Palpations: There is no hepatomegaly, splenomegaly or mass. Abdomen is soft, non-tender, and non-distended. No suprapubic tenderness. No CVA tenderness.   Skin:     Comments: no rash   Neurological:      Cranial Nerves: Cranial nerves 2-12 are intact.      Sensory: No sensory deficit.      Motor: Motor function is intact.      Deep Tendon Reflexes: Reflexes are normal and symmetric.         Procedures    Point of Care Test & Imaging Results from this visit  No results found for this visit on 02/12/25.   No results found.    Diagnostic study results (if any) were reviewed by EDGAR Nelson.    Assessment/Plan   Allergies, medications, history, and pertinent labs/EKGs/Imaging reviewed by EDGAR Nelson.     Medical Decision Making  Patient presents with symptoms of nasal congestion, cough, worsening thick green nasal discharge, sinus and facial pressure and pain. Symptoms getting worse. Concerns for URI vs Strep vs Covid vs Flu vs Sinus infection. No concerns for bronchitis or PNA given lungs are clear on auscultations without wheezing or rhonchi. Noted facial pressure with maxillary and frontal sinus tenderness on palpation. Purulent nasal discharge. Will treat for bacterial sinusitis with Augmentin PO BID x10 days. Will add medrol pack  to help with severity of congestion and inflammation. Patient tolerated well in the past without issues.     Orders and Diagnoses  There are no diagnoses linked to this encounter.    Medical Admin Record      Patient disposition: Home    Electronically signed by EDGAR Nelson  11:23 AM

## 2025-03-19 DIAGNOSIS — F41.1 GAD (GENERALIZED ANXIETY DISORDER): ICD-10-CM

## 2025-03-19 RX ORDER — CITALOPRAM 20 MG/1
20 TABLET, FILM COATED ORAL DAILY
Qty: 90 TABLET | Refills: 1 | Status: SHIPPED | OUTPATIENT
Start: 2025-03-19

## 2025-08-26 ENCOUNTER — APPOINTMENT (OUTPATIENT)
Dept: PRIMARY CARE | Facility: CLINIC | Age: 30
End: 2025-08-26
Payer: COMMERCIAL

## 2025-08-26 VITALS
DIASTOLIC BLOOD PRESSURE: 70 MMHG | BODY MASS INDEX: 30.07 KG/M2 | OXYGEN SATURATION: 98 % | HEIGHT: 69 IN | TEMPERATURE: 98.2 F | WEIGHT: 203 LBS | SYSTOLIC BLOOD PRESSURE: 100 MMHG | HEART RATE: 71 BPM

## 2025-08-26 DIAGNOSIS — R53.82 CHRONIC FATIGUE: Primary | ICD-10-CM

## 2025-08-26 DIAGNOSIS — F41.1 GAD (GENERALIZED ANXIETY DISORDER): ICD-10-CM

## 2025-08-26 PROCEDURE — 1036F TOBACCO NON-USER: CPT | Performed by: FAMILY MEDICINE

## 2025-08-26 PROCEDURE — 3074F SYST BP LT 130 MM HG: CPT | Performed by: FAMILY MEDICINE

## 2025-08-26 PROCEDURE — 3078F DIAST BP <80 MM HG: CPT | Performed by: FAMILY MEDICINE

## 2025-08-26 PROCEDURE — 99214 OFFICE O/P EST MOD 30 MIN: CPT | Performed by: FAMILY MEDICINE

## 2025-08-26 PROCEDURE — 3008F BODY MASS INDEX DOCD: CPT | Performed by: FAMILY MEDICINE

## 2025-08-26 RX ORDER — BUPROPION HYDROCHLORIDE 150 MG/1
150 TABLET, EXTENDED RELEASE ORAL DAILY
Qty: 30 TABLET | Refills: 11 | Status: SHIPPED | OUTPATIENT
Start: 2025-08-26 | End: 2026-08-26

## 2025-08-26 RX ORDER — DOXYCYCLINE 100 MG/1
100 CAPSULE ORAL 2 TIMES DAILY
Qty: 14 CAPSULE | Refills: 0 | Status: SHIPPED | OUTPATIENT
Start: 2025-08-26 | End: 2025-09-02

## 2025-08-26 ASSESSMENT — ENCOUNTER SYMPTOMS
FEVER: 0
PALPITATIONS: 0
WHEEZING: 0
COUGH: 0
VOMITING: 0
UNEXPECTED WEIGHT CHANGE: 1
ARTHRALGIAS: 0
MYALGIAS: 0
NAUSEA: 0
JOINT SWELLING: 0
CHILLS: 0
DYSPHORIC MOOD: 0
DYSURIA: 0
DIZZINESS: 0
NUMBNESS: 0
HEADACHES: 0
ABDOMINAL PAIN: 0
DIARRHEA: 0
WEAKNESS: 0
FATIGUE: 1
POLYDIPSIA: 0
PHOTOPHOBIA: 0
SORE THROAT: 0
CONSTIPATION: 0
SINUS PAIN: 0
SINUS PRESSURE: 0
SHORTNESS OF BREATH: 0

## 2025-08-26 ASSESSMENT — PATIENT HEALTH QUESTIONNAIRE - PHQ9
SUM OF ALL RESPONSES TO PHQ9 QUESTIONS 1 AND 2: 0
1. LITTLE INTEREST OR PLEASURE IN DOING THINGS: NOT AT ALL
2. FEELING DOWN, DEPRESSED OR HOPELESS: NOT AT ALL

## 2025-08-26 ASSESSMENT — PAIN SCALES - GENERAL: PAINLEVEL_OUTOF10: 0-NO PAIN

## 2025-08-26 ASSESSMENT — LIFESTYLE VARIABLES: HOW OFTEN DO YOU HAVE A DRINK CONTAINING ALCOHOL: MONTHLY OR LESS

## 2025-08-30 LAB
ALBUMIN SERPL-MCNC: 4.4 G/DL (ref 3.6–5.1)
ALP SERPL-CCNC: 69 U/L (ref 31–125)
ALT SERPL-CCNC: 14 U/L (ref 6–29)
ANION GAP SERPL CALCULATED.4IONS-SCNC: 10 MMOL/L (CALC) (ref 7–17)
AST SERPL-CCNC: 13 U/L (ref 10–30)
BASOPHILS # BLD AUTO: 20 CELLS/UL (ref 0–200)
BASOPHILS NFR BLD AUTO: 0.4 %
BILIRUB SERPL-MCNC: 1.1 MG/DL (ref 0.2–1.2)
BUN SERPL-MCNC: 11 MG/DL (ref 7–25)
CALCIUM SERPL-MCNC: 9.2 MG/DL (ref 8.6–10.2)
CHLORIDE SERPL-SCNC: 103 MMOL/L (ref 98–110)
CO2 SERPL-SCNC: 24 MMOL/L (ref 20–32)
CORTIS SERPL-MCNC: 7.7 MCG/DL
CREAT SERPL-MCNC: 0.65 MG/DL (ref 0.5–0.97)
EGFRCR SERPLBLD CKD-EPI 2021: 121 ML/MIN/1.73M2
EOSINOPHIL # BLD AUTO: 71 CELLS/UL (ref 15–500)
EOSINOPHIL NFR BLD AUTO: 1.4 %
ERYTHROCYTE [DISTWIDTH] IN BLOOD BY AUTOMATED COUNT: 12.4 % (ref 11–15)
GLUCOSE SERPL-MCNC: 91 MG/DL (ref 65–99)
HCT VFR BLD AUTO: 42.9 % (ref 35–45)
HGB BLD-MCNC: 14.6 G/DL (ref 11.7–15.5)
LYMPHOCYTES # BLD AUTO: 1688 CELLS/UL (ref 850–3900)
LYMPHOCYTES NFR BLD AUTO: 33.1 %
MCH RBC QN AUTO: 31.8 PG (ref 27–33)
MCHC RBC AUTO-ENTMCNC: 34 G/DL (ref 32–36)
MCV RBC AUTO: 93.5 FL (ref 80–100)
MONOCYTES # BLD AUTO: 490 CELLS/UL (ref 200–950)
MONOCYTES NFR BLD AUTO: 9.6 %
NEUTROPHILS # BLD AUTO: 2831 CELLS/UL (ref 1500–7800)
NEUTROPHILS NFR BLD AUTO: 55.5 %
PLATELET # BLD AUTO: 223 THOUSAND/UL (ref 140–400)
PMV BLD REES-ECKER: 9.8 FL (ref 7.5–12.5)
POTASSIUM SERPL-SCNC: 4.1 MMOL/L (ref 3.5–5.3)
PROT SERPL-MCNC: 7.1 G/DL (ref 6.1–8.1)
RBC # BLD AUTO: 4.59 MILLION/UL (ref 3.8–5.1)
SODIUM SERPL-SCNC: 137 MMOL/L (ref 135–146)
T3FREE SERPL-MCNC: 3 PG/ML (ref 2.3–4.2)
T4 FREE SERPL-MCNC: 1.1 NG/DL (ref 0.8–1.8)
THYROGLOB AB SERPL-ACNC: <1 IU/ML
THYROGLOB SERPL-MCNC: 6.5 NG/ML
THYROPEROXIDASE AB SERPL-ACNC: 2 IU/ML
TSH SERPL-ACNC: 3.28 MIU/L
WBC # BLD AUTO: 5.1 THOUSAND/UL (ref 3.8–10.8)

## 2025-09-02 ENCOUNTER — RESULTS FOLLOW-UP (OUTPATIENT)
Dept: PRIMARY CARE | Facility: CLINIC | Age: 30
End: 2025-09-02

## 2025-09-02 DIAGNOSIS — E05.90 HYPERTHYROIDISM: Primary | ICD-10-CM

## 2025-09-05 ENCOUNTER — HOSPITAL ENCOUNTER (OUTPATIENT)
Dept: RADIOLOGY | Facility: HOSPITAL | Age: 30
Discharge: HOME | End: 2025-09-05

## 2025-09-05 DIAGNOSIS — E05.90 HYPERTHYROIDISM: ICD-10-CM

## 2025-09-05 PROCEDURE — 76536 US EXAM OF HEAD AND NECK: CPT
